# Patient Record
Sex: FEMALE | Race: WHITE | NOT HISPANIC OR LATINO | URBAN - METROPOLITAN AREA
[De-identification: names, ages, dates, MRNs, and addresses within clinical notes are randomized per-mention and may not be internally consistent; named-entity substitution may affect disease eponyms.]

---

## 2017-05-22 ENCOUNTER — INPATIENT (INPATIENT)
Facility: HOSPITAL | Age: 42
LOS: 21 days | Discharge: HOME | End: 2017-06-13
Attending: PSYCHIATRY & NEUROLOGY | Admitting: PSYCHIATRY & NEUROLOGY
Payer: COMMERCIAL

## 2017-05-22 VITALS
HEART RATE: 100 BPM | SYSTOLIC BLOOD PRESSURE: 140 MMHG | TEMPERATURE: 98 F | OXYGEN SATURATION: 98 % | DIASTOLIC BLOOD PRESSURE: 90 MMHG | RESPIRATION RATE: 18 BRPM

## 2017-05-22 DIAGNOSIS — F33.1 MAJOR DEPRESSIVE DISORDER, RECURRENT, MODERATE: ICD-10-CM

## 2017-05-22 DIAGNOSIS — F33.2 MAJOR DEPRESSIVE DISORDER, RECURRENT SEVERE WITHOUT PSYCHOTIC FEATURES: ICD-10-CM

## 2017-05-22 LAB
ALBUMIN SERPL ELPH-MCNC: 4 G/DL — SIGNIFICANT CHANGE UP (ref 3.3–5)
ALP SERPL-CCNC: 117 U/L — SIGNIFICANT CHANGE UP (ref 40–120)
ALT FLD-CCNC: 21 U/L — SIGNIFICANT CHANGE UP (ref 4–33)
AMPHET UR-MCNC: NEGATIVE — SIGNIFICANT CHANGE UP
APAP SERPL-MCNC: < 15 UG/ML — LOW (ref 15–25)
APPEARANCE UR: CLEAR — SIGNIFICANT CHANGE UP
AST SERPL-CCNC: 31 U/L — SIGNIFICANT CHANGE UP (ref 4–32)
BACTERIA # UR AUTO: SIGNIFICANT CHANGE UP
BARBITURATES MEASUREMENT: NEGATIVE — SIGNIFICANT CHANGE UP
BARBITURATES UR SCN-MCNC: NEGATIVE — SIGNIFICANT CHANGE UP
BASOPHILS # BLD AUTO: 0.03 K/UL — SIGNIFICANT CHANGE UP (ref 0–0.2)
BASOPHILS NFR BLD AUTO: 0.3 % — SIGNIFICANT CHANGE UP (ref 0–2)
BENZODIAZ SERPL-MCNC: NEGATIVE — SIGNIFICANT CHANGE UP
BENZODIAZ UR-MCNC: NEGATIVE — SIGNIFICANT CHANGE UP
BILIRUB SERPL-MCNC: < 0.2 MG/DL — LOW (ref 0.2–1.2)
BILIRUB UR-MCNC: NEGATIVE — SIGNIFICANT CHANGE UP
BLOOD UR QL VISUAL: NEGATIVE — SIGNIFICANT CHANGE UP
BUN SERPL-MCNC: 11 MG/DL — SIGNIFICANT CHANGE UP (ref 7–23)
CALCIUM SERPL-MCNC: 9.1 MG/DL — SIGNIFICANT CHANGE UP (ref 8.4–10.5)
CANNABINOIDS UR-MCNC: NEGATIVE — SIGNIFICANT CHANGE UP
CHLORIDE SERPL-SCNC: 105 MMOL/L — SIGNIFICANT CHANGE UP (ref 98–107)
CO2 SERPL-SCNC: 18 MMOL/L — LOW (ref 22–31)
COCAINE METAB.OTHER UR-MCNC: NEGATIVE — SIGNIFICANT CHANGE UP
COLOR SPEC: SIGNIFICANT CHANGE UP
CREAT SERPL-MCNC: 0.62 MG/DL — SIGNIFICANT CHANGE UP (ref 0.5–1.3)
EOSINOPHIL # BLD AUTO: 0.35 K/UL — SIGNIFICANT CHANGE UP (ref 0–0.5)
EOSINOPHIL NFR BLD AUTO: 3.5 % — SIGNIFICANT CHANGE UP (ref 0–6)
ETHANOL BLD-MCNC: < 10 MG/DL — SIGNIFICANT CHANGE UP
GLUCOSE SERPL-MCNC: 104 MG/DL — HIGH (ref 70–99)
GLUCOSE UR-MCNC: NEGATIVE — SIGNIFICANT CHANGE UP
HCG SERPL-ACNC: < 5 MIU/ML — SIGNIFICANT CHANGE UP
HCT VFR BLD CALC: 42.9 % — SIGNIFICANT CHANGE UP (ref 34.5–45)
HGB BLD-MCNC: 14.4 G/DL — SIGNIFICANT CHANGE UP (ref 11.5–15.5)
IMM GRANULOCYTES NFR BLD AUTO: 0.4 % — SIGNIFICANT CHANGE UP (ref 0–1.5)
KETONES UR-MCNC: NEGATIVE — SIGNIFICANT CHANGE UP
LEUKOCYTE ESTERASE UR-ACNC: HIGH
LYMPHOCYTES # BLD AUTO: 2.42 K/UL — SIGNIFICANT CHANGE UP (ref 1–3.3)
LYMPHOCYTES # BLD AUTO: 24.5 % — SIGNIFICANT CHANGE UP (ref 13–44)
MCHC RBC-ENTMCNC: 31.4 PG — SIGNIFICANT CHANGE UP (ref 27–34)
MCHC RBC-ENTMCNC: 33.6 % — SIGNIFICANT CHANGE UP (ref 32–36)
MCV RBC AUTO: 93.5 FL — SIGNIFICANT CHANGE UP (ref 80–100)
METHADONE UR-MCNC: NEGATIVE — SIGNIFICANT CHANGE UP
MONOCYTES # BLD AUTO: 0.81 K/UL — SIGNIFICANT CHANGE UP (ref 0–0.9)
MONOCYTES NFR BLD AUTO: 8.2 % — SIGNIFICANT CHANGE UP (ref 2–14)
MUCOUS THREADS # UR AUTO: SIGNIFICANT CHANGE UP
NEUTROPHILS # BLD AUTO: 6.21 K/UL — SIGNIFICANT CHANGE UP (ref 1.8–7.4)
NEUTROPHILS NFR BLD AUTO: 63.1 % — SIGNIFICANT CHANGE UP (ref 43–77)
NITRITE UR-MCNC: NEGATIVE — SIGNIFICANT CHANGE UP
NON-SQ EPI CELLS # UR AUTO: <1 — SIGNIFICANT CHANGE UP
OPIATES UR-MCNC: NEGATIVE — SIGNIFICANT CHANGE UP
OXYCODONE UR-MCNC: NEGATIVE — SIGNIFICANT CHANGE UP
PCP UR-MCNC: NEGATIVE — SIGNIFICANT CHANGE UP
PH UR: 6 — SIGNIFICANT CHANGE UP (ref 4.6–8)
PLATELET # BLD AUTO: 250 K/UL — SIGNIFICANT CHANGE UP (ref 150–400)
PMV BLD: 11.2 FL — SIGNIFICANT CHANGE UP (ref 7–13)
POTASSIUM SERPL-MCNC: 4.5 MMOL/L — SIGNIFICANT CHANGE UP (ref 3.5–5.3)
POTASSIUM SERPL-SCNC: 4.5 MMOL/L — SIGNIFICANT CHANGE UP (ref 3.5–5.3)
PROT SERPL-MCNC: 6.9 G/DL — SIGNIFICANT CHANGE UP (ref 6–8.3)
PROT UR-MCNC: 10 — SIGNIFICANT CHANGE UP
RBC # BLD: 4.59 M/UL — SIGNIFICANT CHANGE UP (ref 3.8–5.2)
RBC # FLD: 14.5 % — SIGNIFICANT CHANGE UP (ref 10.3–14.5)
RBC CASTS # UR COMP ASSIST: SIGNIFICANT CHANGE UP (ref 0–?)
SALICYLATES SERPL-MCNC: < 5 MG/DL — LOW (ref 15–30)
SODIUM SERPL-SCNC: 141 MMOL/L — SIGNIFICANT CHANGE UP (ref 135–145)
SP GR SPEC: 1.02 — SIGNIFICANT CHANGE UP (ref 1–1.03)
SQUAMOUS # UR AUTO: SIGNIFICANT CHANGE UP
TSH SERPL-MCNC: 1.28 UIU/ML — SIGNIFICANT CHANGE UP (ref 0.27–4.2)
UROBILINOGEN FLD QL: NORMAL E.U. — SIGNIFICANT CHANGE UP (ref 0.1–0.2)
WBC # BLD: 9.86 K/UL — SIGNIFICANT CHANGE UP (ref 3.8–10.5)
WBC # FLD AUTO: 9.86 K/UL — SIGNIFICANT CHANGE UP (ref 3.8–10.5)
WBC UR QL: SIGNIFICANT CHANGE UP (ref 0–?)

## 2017-05-22 PROCEDURE — 99285 EMERGENCY DEPT VISIT HI MDM: CPT

## 2017-05-22 RX ORDER — HALOPERIDOL DECANOATE 100 MG/ML
5 INJECTION INTRAMUSCULAR EVERY 6 HOURS
Qty: 0 | Refills: 0 | Status: DISCONTINUED | OUTPATIENT
Start: 2017-05-22 | End: 2017-06-13

## 2017-05-22 RX ORDER — DIPHENHYDRAMINE HCL 50 MG
50 CAPSULE ORAL ONCE
Qty: 0 | Refills: 0 | Status: DISCONTINUED | OUTPATIENT
Start: 2017-05-22 | End: 2017-06-13

## 2017-05-22 RX ORDER — ACETAMINOPHEN 500 MG
650 TABLET ORAL EVERY 6 HOURS
Qty: 0 | Refills: 0 | Status: DISCONTINUED | OUTPATIENT
Start: 2017-05-22 | End: 2017-06-13

## 2017-05-22 RX ORDER — HALOPERIDOL DECANOATE 100 MG/ML
5 INJECTION INTRAMUSCULAR ONCE
Qty: 0 | Refills: 0 | Status: DISCONTINUED | OUTPATIENT
Start: 2017-05-22 | End: 2017-06-13

## 2017-05-22 RX ORDER — VENLAFAXINE HCL 75 MG
150 CAPSULE, EXT RELEASE 24 HR ORAL DAILY
Qty: 0 | Refills: 0 | Status: DISCONTINUED | OUTPATIENT
Start: 2017-05-22 | End: 2017-05-24

## 2017-05-22 RX ORDER — DIPHENHYDRAMINE HCL 50 MG
50 CAPSULE ORAL EVERY 6 HOURS
Qty: 0 | Refills: 0 | Status: DISCONTINUED | OUTPATIENT
Start: 2017-05-22 | End: 2017-06-13

## 2017-05-22 RX ORDER — TOPIRAMATE 25 MG
200 TABLET ORAL AT BEDTIME
Qty: 0 | Refills: 0 | Status: DISCONTINUED | OUTPATIENT
Start: 2017-05-22 | End: 2017-06-13

## 2017-05-22 NOTE — ED PROVIDER NOTE - OBJECTIVE STATEMENT
The patient is a 41y Female hx of depression presents to ed for psych eval 2/2 worsening depression w/ Suicidal thoughts and plan to overdose.  Pt denies recent injuries, trauma or falls, no headache, back or neck pain, no abd/flank pain, no uti/urinary symptoms, nausea or vomiting, no fever or chills, no cp or sob, no palpitations or diaphoresis.  Denies etoh or illicit drugs, no HI, no AVH.  Endorsed no other symptoms.
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
## 2017-05-22 NOTE — ED BEHAVIORAL HEALTH ASSESSMENT NOTE - ACTIVATING EVENTS/STRESSORS
Current or pending isolation/Recent humiliation/shame/Pending incarceration or homelessness Current or pending isolation/Other/Recent humiliation/shame

## 2017-05-22 NOTE — ED BEHAVIORAL HEALTH ASSESSMENT NOTE - SUICIDE RISK FACTORS
Perceived burden on family and others/Agitation/severe anxiety/Highly impulsive behavior/History of abuse/trauma/Access to means (pills, firearms, etc.)/Hopelessness/Mood episode/Other/Anhedonia

## 2017-05-22 NOTE — ED BEHAVIORAL HEALTH NOTE - BEHAVIORAL HEALTH NOTE
Writer spoke with patient’s , Los Martinez, [phone], on the phone, for collateral information. He reported the following:    Patient resides with him and their 4 children, ages 19, 13 (twins) and 7. She is in treatment at [facility] after having had several inpatient episodes at [facility] over the past few years, most recently at [facility] 6 weeks ago. She has been expressing suicidal ideation for the past couple of days, so the informant brought her to the ED.     The patient has been increasingly depressed, and not tending to her hygiene or the household, though she has been forcing herself to provide for the needs of the children. She has been experiencing broken sleep and nightmares, and anergia, lying around the house all day. She talks in her sleep, revealing strange dreams. In recent days, she has stated she does not want to live. She has been mentioning instructions on what she wants done by other people after she dies. She told the informant not to worry, because if she “does something to herself, it won’t be in the house.” Though she has a history of impulsive outbursts, this has not happened lately. She has been asking the informant what he thinks of her going off her medications for the past 4 days. The informant believes she has been intermittently compliant with her medications. At [facility] the patient was put on Effexor. She has been taken off Cymbalta, and is suffering with migraines. Three weeks ago she stopped taking a sleep medication due to weight gain. She has been eating well. She has not been aggressive or violent toward anyone or property, and has not verbalized thoughts of such behavior. She does not have access to a gun. She has no history of substance abuse.     Writer informed Mr. Martinez that the patient would be admitted. Writer spoke with patient’s , Los Martinez, [phone], on the phone, for collateral information. He reported the following:    Patient resides with him and their 4 children, ages 19, 13 (twins) and 7. She is in treatment at [facility] after having had several inpatient episodes at [facility] over the past few years, most recently at [facility] 6 weeks ago. She has been expressing suicidal ideation for the past couple of days, so the informant brought her to the ED.     The patient has been increasingly depressed, and not tending to her hygiene or the household, though she has been forcing herself to provide for the needs of the children. She has been experiencing broken sleep and nightmares, and anergia, lying around the house all day. She talks in her sleep, revealing strange dreams. In recent days, she has stated she does not want to live. She has been mentioning instructions on what she wants done by other people after she dies. She told the informant not to worry, because if she “does something to herself, it won’t be in the house.” Though she has a history of impulsive outbursts, this has not happened lately. She has been asking the informant what he thinks of her going off her medications for the past 4 days. The informant believes she has been intermittently compliant with her medications. At [facility] the patient was put on Effexor. She has been taken off Cymbalta, and is suffering with migraines. Three weeks ago she stopped taking a sleep medication due to weight gain. She has been eating well. She has not been aggressive or violent toward anyone or property, and has not verbalized thoughts of such behavior. She does not have access to a gun. She has no history of substance abuse.     Writer informed Mr. Martinez that the patient would be admitted to [facility], providing the phone number to the unit.
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
## 2017-05-22 NOTE — ED BEHAVIORAL HEALTH ASSESSMENT NOTE - FAMILY DETAILS
Lives with , 12 y/o twins, 5 y/o child, 20 y/o son is at college. Lives with , 12 y/o twins, 5 y/o child, 20 y/o daughter is at college but currently home

## 2017-05-22 NOTE — ED BEHAVIORAL HEALTH ASSESSMENT NOTE - PRIMARY DX
Moderate episode of recurrent major depressive disorder Borderline personality disorder in adult Severe episode of recurrent major depressive disorder, without psychotic features

## 2017-05-22 NOTE — ED ADULT TRIAGE NOTE - CHIEF COMPLAINT QUOTE
p/t c/o of increased depression and si for past few days with plan p/t appears calm and cooperative @ present

## 2017-05-22 NOTE — ED BEHAVIORAL HEALTH ASSESSMENT NOTE - PSYCHIATRIC ISSUES AND PLAN (INCLUDE STANDING AND PRN MEDICATION)
continue Topamax 200 mg po qday, Cymbalta 40 mg po daily, Zoloft 150mg daily, Trazodone 25mg qhs, Naltrexone 50 mg po BID, Anxiety: Atarax 50mg q6h PRN Continue Effexor 150mg AM (Pt requesting to come off this medication) and Topamax 200mg HS. Consider ECT as patient has been tried on multiple medication trials without success. May utilize PRNS: haldol 5mg, ativan 2mg, diphenhydramine 50mg, PO/IM, Q6H for Agitation
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
## 2017-05-22 NOTE — ED PROVIDER NOTE - MEDICAL DECISION MAKING DETAILS
41y Female hx of depression presents to ed for psych eval 2/2 worsening depression w/ Suicidal thoughts and plan to overdose.  Pt is well appearing w/o visible signs of physical injuries on exam, head NCAT, no C-spine tend, CN II- XII intact, ambulating w/ steady gait.  Labs unrevealing, medically cleared for psych disposition.

## 2017-05-22 NOTE — ED BEHAVIORAL HEALTH ASSESSMENT NOTE - CURRENT MEDICATION
Topamax 200 mg po qday, Cymbalta 40 mg po daily, Zoloft 150mg daily, Trazodone 25mg qhs, Naltrexone 50 mg po BID Topamax 200 mg po HS, Topamax 200 mg po HS, Effexor 300mg AM (has only been taking 150mg daily for past 3 days).

## 2017-05-22 NOTE — ED BEHAVIORAL HEALTH ASSESSMENT NOTE - RISK ASSESSMENT
Pt endorses SI w/ plans to OD on trazodone. Chronic and unmodifiable risk factors include hx of MDD, trauma, hopelessness, social isolation and losses due to her ongoing legal issues, impulsive-aggressive behavior, hx of self-injurious behavior. The patient's protective risk factors include no access to guns, dependents, positive therapeutic relationships and supportive family. Based on the patient’s worsening depression and suicidality, she requires inpatient hospitalization for safety and stabilization.

## 2017-05-22 NOTE — ED BEHAVIORAL HEALTH ASSESSMENT NOTE - PAST PSYCHOTROPIC MEDICATION
Wellbutrin, Atarax, Abilify. Wellbutrin, Atarax, Abilify.Cymbalta 40 mg po daily, Zoloft 150mg daily, Trazodone 25mg qhs, Naltrexone 50 mg po BID Wellbutrin, Atarax, Abilify. Cymbalta, Zoloft, Trazodone, Naltrexone, Buspar, Latuda, Seroquel, Neurontin.
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
## 2017-05-23 PROCEDURE — 99223 1ST HOSP IP/OBS HIGH 75: CPT

## 2017-05-23 RX ORDER — HYDROXYZINE HCL 10 MG
50 TABLET ORAL EVERY 6 HOURS
Qty: 0 | Refills: 0 | Status: DISCONTINUED | OUTPATIENT
Start: 2017-05-23 | End: 2017-06-13

## 2017-05-23 RX ADMIN — Medication 50 MILLIGRAM(S): at 16:11

## 2017-05-23 RX ADMIN — Medication 2 MILLIGRAM(S): at 01:38

## 2017-05-23 RX ADMIN — Medication 150 MILLIGRAM(S): at 08:30

## 2017-05-23 RX ADMIN — Medication 200 MILLIGRAM(S): at 20:55

## 2017-05-23 RX ADMIN — Medication 200 MILLIGRAM(S): at 01:37

## 2017-05-24 PROCEDURE — 99232 SBSQ HOSP IP/OBS MODERATE 35: CPT | Mod: 25

## 2017-05-24 RX ORDER — VENLAFAXINE HCL 75 MG
112.5 CAPSULE, EXT RELEASE 24 HR ORAL DAILY
Qty: 0 | Refills: 0 | Status: DISCONTINUED | OUTPATIENT
Start: 2017-05-24 | End: 2017-05-25

## 2017-05-24 RX ORDER — VORTIOXETINE 5 MG/1
1 TABLET, FILM COATED ORAL
Qty: 7 | Refills: 0 | OUTPATIENT
Start: 2017-05-24 | End: 2017-05-31

## 2017-05-24 RX ORDER — CLONAZEPAM 1 MG
0.5 TABLET ORAL DAILY
Qty: 0 | Refills: 0 | Status: DISCONTINUED | OUTPATIENT
Start: 2017-05-24 | End: 2017-05-25

## 2017-05-24 RX ORDER — CLONAZEPAM 1 MG
1 TABLET ORAL AT BEDTIME
Qty: 0 | Refills: 0 | Status: DISCONTINUED | OUTPATIENT
Start: 2017-05-24 | End: 2017-05-25

## 2017-05-24 RX ADMIN — Medication 200 MILLIGRAM(S): at 21:20

## 2017-05-24 RX ADMIN — Medication 1 MILLIGRAM(S): at 21:20

## 2017-05-24 RX ADMIN — Medication 150 MILLIGRAM(S): at 09:16

## 2017-05-25 PROCEDURE — 90834 PSYTX W PT 45 MINUTES: CPT | Mod: 59

## 2017-05-25 PROCEDURE — 99232 SBSQ HOSP IP/OBS MODERATE 35: CPT

## 2017-05-25 PROCEDURE — 90853 GROUP PSYCHOTHERAPY: CPT

## 2017-05-25 RX ORDER — CLONAZEPAM 1 MG
0.5 TABLET ORAL DAILY
Qty: 0 | Refills: 0 | Status: DISCONTINUED | OUTPATIENT
Start: 2017-05-25 | End: 2017-06-01

## 2017-05-25 RX ORDER — VENLAFAXINE HCL 75 MG
75 CAPSULE, EXT RELEASE 24 HR ORAL DAILY
Qty: 0 | Refills: 0 | Status: DISCONTINUED | OUTPATIENT
Start: 2017-05-25 | End: 2017-05-30

## 2017-05-25 RX ORDER — CLONAZEPAM 1 MG
1 TABLET ORAL AT BEDTIME
Qty: 0 | Refills: 0 | Status: DISCONTINUED | OUTPATIENT
Start: 2017-05-25 | End: 2017-06-01

## 2017-05-25 RX ORDER — VORTIOXETINE 5 MG/1
5 TABLET, FILM COATED ORAL DAILY
Qty: 0 | Refills: 0 | Status: DISCONTINUED | OUTPATIENT
Start: 2017-05-25 | End: 2017-05-31

## 2017-05-25 RX ADMIN — Medication 200 MILLIGRAM(S): at 21:43

## 2017-05-25 RX ADMIN — Medication 1 MILLIGRAM(S): at 21:43

## 2017-05-25 RX ADMIN — Medication 650 MILLIGRAM(S): at 13:57

## 2017-05-25 RX ADMIN — Medication 0.5 MILLIGRAM(S): at 08:43

## 2017-05-25 RX ADMIN — Medication 112.5 MILLIGRAM(S): at 08:43

## 2017-05-25 RX ADMIN — Medication 650 MILLIGRAM(S): at 13:12

## 2017-05-26 PROCEDURE — 99232 SBSQ HOSP IP/OBS MODERATE 35: CPT

## 2017-05-26 RX ADMIN — Medication 0.5 MILLIGRAM(S): at 09:24

## 2017-05-26 RX ADMIN — Medication 1 MILLIGRAM(S): at 21:10

## 2017-05-26 RX ADMIN — Medication 75 MILLIGRAM(S): at 09:24

## 2017-05-26 RX ADMIN — VORTIOXETINE 5 MILLIGRAM(S): 5 TABLET, FILM COATED ORAL at 09:24

## 2017-05-26 RX ADMIN — Medication 200 MILLIGRAM(S): at 21:10

## 2017-05-27 PROCEDURE — 99232 SBSQ HOSP IP/OBS MODERATE 35: CPT

## 2017-05-27 RX ADMIN — Medication 200 MILLIGRAM(S): at 22:17

## 2017-05-27 RX ADMIN — Medication 0.5 MILLIGRAM(S): at 09:00

## 2017-05-27 RX ADMIN — Medication 75 MILLIGRAM(S): at 09:00

## 2017-05-27 RX ADMIN — VORTIOXETINE 5 MILLIGRAM(S): 5 TABLET, FILM COATED ORAL at 09:00

## 2017-05-27 RX ADMIN — Medication 1 MILLIGRAM(S): at 22:17

## 2017-05-28 LAB — HBA1C BLD-MCNC: 5.6 % — SIGNIFICANT CHANGE UP (ref 4–5.6)

## 2017-05-28 RX ADMIN — Medication 75 MILLIGRAM(S): at 08:55

## 2017-05-28 RX ADMIN — Medication 650 MILLIGRAM(S): at 13:18

## 2017-05-28 RX ADMIN — Medication 650 MILLIGRAM(S): at 14:07

## 2017-05-28 RX ADMIN — Medication 200 MILLIGRAM(S): at 21:17

## 2017-05-28 RX ADMIN — Medication 50 MILLIGRAM(S): at 14:54

## 2017-05-28 RX ADMIN — VORTIOXETINE 5 MILLIGRAM(S): 5 TABLET, FILM COATED ORAL at 08:55

## 2017-05-28 RX ADMIN — Medication 0.5 MILLIGRAM(S): at 08:55

## 2017-05-28 RX ADMIN — Medication 1 MILLIGRAM(S): at 21:17

## 2017-05-29 RX ADMIN — Medication 75 MILLIGRAM(S): at 08:41

## 2017-05-29 RX ADMIN — Medication 0.5 MILLIGRAM(S): at 08:41

## 2017-05-29 RX ADMIN — Medication 200 MILLIGRAM(S): at 21:03

## 2017-05-29 RX ADMIN — Medication 1 MILLIGRAM(S): at 21:03

## 2017-05-29 RX ADMIN — VORTIOXETINE 5 MILLIGRAM(S): 5 TABLET, FILM COATED ORAL at 08:41

## 2017-05-30 PROCEDURE — 99232 SBSQ HOSP IP/OBS MODERATE 35: CPT | Mod: 25

## 2017-05-30 RX ORDER — VORTIOXETINE 5 MG/1
1 TABLET, FILM COATED ORAL
Qty: 15 | Refills: 0 | OUTPATIENT
Start: 2017-05-30 | End: 2017-06-14

## 2017-05-30 RX ORDER — VENLAFAXINE HCL 75 MG
37.5 CAPSULE, EXT RELEASE 24 HR ORAL DAILY
Qty: 0 | Refills: 0 | Status: DISCONTINUED | OUTPATIENT
Start: 2017-05-30 | End: 2017-06-01

## 2017-05-30 RX ADMIN — VORTIOXETINE 5 MILLIGRAM(S): 5 TABLET, FILM COATED ORAL at 08:57

## 2017-05-30 RX ADMIN — Medication 1 MILLIGRAM(S): at 22:55

## 2017-05-30 RX ADMIN — Medication 75 MILLIGRAM(S): at 08:57

## 2017-05-30 RX ADMIN — Medication 0.5 MILLIGRAM(S): at 08:57

## 2017-05-30 RX ADMIN — Medication 200 MILLIGRAM(S): at 22:55

## 2017-05-31 PROCEDURE — 90834 PSYTX W PT 45 MINUTES: CPT

## 2017-05-31 PROCEDURE — 99232 SBSQ HOSP IP/OBS MODERATE 35: CPT

## 2017-05-31 RX ORDER — VORTIOXETINE 5 MG/1
10 TABLET, FILM COATED ORAL DAILY
Qty: 0 | Refills: 0 | Status: DISCONTINUED | OUTPATIENT
Start: 2017-06-01 | End: 2017-06-09

## 2017-05-31 RX ORDER — VORTIOXETINE 5 MG/1
10 TABLET, FILM COATED ORAL ONCE
Qty: 0 | Refills: 0 | Status: COMPLETED | OUTPATIENT
Start: 2017-05-31 | End: 2017-05-31

## 2017-05-31 RX ADMIN — Medication 37.5 MILLIGRAM(S): at 09:16

## 2017-05-31 RX ADMIN — Medication 1 MILLIGRAM(S): at 22:59

## 2017-05-31 RX ADMIN — Medication 650 MILLIGRAM(S): at 11:33

## 2017-05-31 RX ADMIN — VORTIOXETINE 10 MILLIGRAM(S): 5 TABLET, FILM COATED ORAL at 15:31

## 2017-05-31 RX ADMIN — Medication 650 MILLIGRAM(S): at 13:43

## 2017-05-31 RX ADMIN — Medication 200 MILLIGRAM(S): at 22:59

## 2017-05-31 RX ADMIN — Medication 0.5 MILLIGRAM(S): at 09:16

## 2017-06-01 PROCEDURE — 99232 SBSQ HOSP IP/OBS MODERATE 35: CPT

## 2017-06-01 RX ORDER — CLONAZEPAM 1 MG
1 TABLET ORAL AT BEDTIME
Qty: 0 | Refills: 0 | Status: DISCONTINUED | OUTPATIENT
Start: 2017-06-01 | End: 2017-06-08

## 2017-06-01 RX ORDER — CLONAZEPAM 1 MG
0.5 TABLET ORAL DAILY
Qty: 0 | Refills: 0 | Status: DISCONTINUED | OUTPATIENT
Start: 2017-06-01 | End: 2017-06-08

## 2017-06-01 RX ADMIN — Medication 1 MILLIGRAM(S): at 23:43

## 2017-06-01 RX ADMIN — Medication 200 MILLIGRAM(S): at 23:43

## 2017-06-01 RX ADMIN — VORTIOXETINE 10 MILLIGRAM(S): 5 TABLET, FILM COATED ORAL at 09:30

## 2017-06-01 RX ADMIN — Medication 37.5 MILLIGRAM(S): at 09:30

## 2017-06-01 RX ADMIN — Medication 0.5 MILLIGRAM(S): at 09:30

## 2017-06-02 PROCEDURE — 90832 PSYTX W PT 30 MINUTES: CPT

## 2017-06-02 PROCEDURE — 99232 SBSQ HOSP IP/OBS MODERATE 35: CPT

## 2017-06-02 RX ADMIN — Medication 1 MILLIGRAM(S): at 23:04

## 2017-06-02 RX ADMIN — Medication 200 MILLIGRAM(S): at 23:04

## 2017-06-02 RX ADMIN — Medication 0.5 MILLIGRAM(S): at 08:40

## 2017-06-02 RX ADMIN — VORTIOXETINE 10 MILLIGRAM(S): 5 TABLET, FILM COATED ORAL at 08:40

## 2017-06-03 PROCEDURE — 99232 SBSQ HOSP IP/OBS MODERATE 35: CPT

## 2017-06-03 RX ADMIN — Medication 2 MILLIGRAM(S): at 19:51

## 2017-06-03 RX ADMIN — VORTIOXETINE 10 MILLIGRAM(S): 5 TABLET, FILM COATED ORAL at 08:34

## 2017-06-03 RX ADMIN — Medication 1 MILLIGRAM(S): at 20:26

## 2017-06-03 RX ADMIN — Medication 0.5 MILLIGRAM(S): at 08:34

## 2017-06-03 RX ADMIN — Medication 2 MILLIGRAM(S): at 11:28

## 2017-06-03 RX ADMIN — Medication 200 MILLIGRAM(S): at 20:26

## 2017-06-04 PROCEDURE — 99232 SBSQ HOSP IP/OBS MODERATE 35: CPT

## 2017-06-04 RX ADMIN — Medication 0.5 MILLIGRAM(S): at 10:10

## 2017-06-04 RX ADMIN — Medication 1 MILLIGRAM(S): at 22:37

## 2017-06-04 RX ADMIN — VORTIOXETINE 10 MILLIGRAM(S): 5 TABLET, FILM COATED ORAL at 10:10

## 2017-06-04 RX ADMIN — Medication 200 MILLIGRAM(S): at 22:37

## 2017-06-05 PROCEDURE — 99232 SBSQ HOSP IP/OBS MODERATE 35: CPT

## 2017-06-05 PROCEDURE — 90832 PSYTX W PT 30 MINUTES: CPT

## 2017-06-05 RX ADMIN — Medication 1 MILLIGRAM(S): at 22:19

## 2017-06-05 RX ADMIN — Medication 200 MILLIGRAM(S): at 22:19

## 2017-06-05 RX ADMIN — VORTIOXETINE 10 MILLIGRAM(S): 5 TABLET, FILM COATED ORAL at 10:43

## 2017-06-05 RX ADMIN — Medication 0.5 MILLIGRAM(S): at 10:43

## 2017-06-06 PROCEDURE — 99232 SBSQ HOSP IP/OBS MODERATE 35: CPT

## 2017-06-06 RX ADMIN — Medication 1 MILLIGRAM(S): at 23:42

## 2017-06-06 RX ADMIN — Medication 200 MILLIGRAM(S): at 23:42

## 2017-06-06 RX ADMIN — Medication 0.5 MILLIGRAM(S): at 09:03

## 2017-06-06 RX ADMIN — VORTIOXETINE 10 MILLIGRAM(S): 5 TABLET, FILM COATED ORAL at 09:03

## 2017-06-07 PROCEDURE — 99232 SBSQ HOSP IP/OBS MODERATE 35: CPT

## 2017-06-07 RX ADMIN — Medication 200 MILLIGRAM(S): at 22:29

## 2017-06-07 RX ADMIN — Medication 0.5 MILLIGRAM(S): at 09:16

## 2017-06-07 RX ADMIN — Medication 1 MILLIGRAM(S): at 22:29

## 2017-06-07 RX ADMIN — VORTIOXETINE 10 MILLIGRAM(S): 5 TABLET, FILM COATED ORAL at 09:16

## 2017-06-07 RX ADMIN — Medication 50 MILLIGRAM(S): at 20:00

## 2017-06-08 PROCEDURE — 99232 SBSQ HOSP IP/OBS MODERATE 35: CPT

## 2017-06-08 RX ORDER — CLONAZEPAM 1 MG
2 TABLET ORAL AT BEDTIME
Qty: 0 | Refills: 0 | Status: DISCONTINUED | OUTPATIENT
Start: 2017-06-08 | End: 2017-06-13

## 2017-06-08 RX ADMIN — VORTIOXETINE 10 MILLIGRAM(S): 5 TABLET, FILM COATED ORAL at 08:38

## 2017-06-08 RX ADMIN — Medication 0.5 MILLIGRAM(S): at 08:38

## 2017-06-09 PROCEDURE — 99232 SBSQ HOSP IP/OBS MODERATE 35: CPT

## 2017-06-09 PROCEDURE — 90834 PSYTX W PT 45 MINUTES: CPT

## 2017-06-09 RX ORDER — VORTIOXETINE 5 MG/1
1 TABLET, FILM COATED ORAL
Qty: 30 | Refills: 0 | OUTPATIENT
Start: 2017-06-09 | End: 2017-07-09

## 2017-06-09 RX ORDER — CLONAZEPAM 1 MG
0.5 TABLET ORAL DAILY
Qty: 0 | Refills: 0 | Status: DISCONTINUED | OUTPATIENT
Start: 2017-06-10 | End: 2017-06-13

## 2017-06-09 RX ORDER — VORTIOXETINE 5 MG/1
20 TABLET, FILM COATED ORAL DAILY
Qty: 0 | Refills: 0 | Status: DISCONTINUED | OUTPATIENT
Start: 2017-06-09 | End: 2017-06-13

## 2017-06-09 RX ORDER — CLONAZEPAM 1 MG
0.5 TABLET ORAL ONCE
Qty: 0 | Refills: 0 | Status: DISCONTINUED | OUTPATIENT
Start: 2017-06-09 | End: 2017-06-09

## 2017-06-09 RX ADMIN — Medication 0.5 MILLIGRAM(S): at 11:41

## 2017-06-09 RX ADMIN — Medication 2 MILLIGRAM(S): at 22:36

## 2017-06-09 RX ADMIN — VORTIOXETINE 10 MILLIGRAM(S): 5 TABLET, FILM COATED ORAL at 09:25

## 2017-06-09 RX ADMIN — Medication 200 MILLIGRAM(S): at 22:36

## 2017-06-10 RX ADMIN — Medication 2 MILLIGRAM(S): at 23:40

## 2017-06-10 RX ADMIN — Medication 200 MILLIGRAM(S): at 23:40

## 2017-06-10 RX ADMIN — VORTIOXETINE 20 MILLIGRAM(S): 5 TABLET, FILM COATED ORAL at 09:14

## 2017-06-10 RX ADMIN — Medication 0.5 MILLIGRAM(S): at 09:14

## 2017-06-11 RX ADMIN — Medication 0.5 MILLIGRAM(S): at 09:42

## 2017-06-11 RX ADMIN — Medication 50 MILLIGRAM(S): at 14:59

## 2017-06-11 RX ADMIN — VORTIOXETINE 20 MILLIGRAM(S): 5 TABLET, FILM COATED ORAL at 09:42

## 2017-06-12 PROCEDURE — 90834 PSYTX W PT 45 MINUTES: CPT

## 2017-06-12 PROCEDURE — 99232 SBSQ HOSP IP/OBS MODERATE 35: CPT

## 2017-06-12 RX ORDER — TOPIRAMATE 25 MG
1 TABLET ORAL
Qty: 30 | Refills: 0 | OUTPATIENT
Start: 2017-06-12 | End: 2017-07-12

## 2017-06-12 RX ORDER — CLONAZEPAM 1 MG
1 TABLET ORAL
Qty: 30 | Refills: 0 | OUTPATIENT
Start: 2017-06-12 | End: 2017-07-12

## 2017-06-12 RX ADMIN — Medication 2 MILLIGRAM(S): at 01:16

## 2017-06-12 RX ADMIN — Medication 0.5 MILLIGRAM(S): at 08:05

## 2017-06-12 RX ADMIN — VORTIOXETINE 20 MILLIGRAM(S): 5 TABLET, FILM COATED ORAL at 08:05

## 2017-06-12 RX ADMIN — Medication 200 MILLIGRAM(S): at 01:16

## 2017-06-13 VITALS — TEMPERATURE: 98 F

## 2017-06-13 RX ADMIN — Medication 200 MILLIGRAM(S): at 01:39

## 2017-06-13 RX ADMIN — Medication 2 MILLIGRAM(S): at 01:39
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
## 2022-03-28 ENCOUNTER — OUTPATIENT (OUTPATIENT)
Dept: OUTPATIENT SERVICES | Facility: HOSPITAL | Age: 47
LOS: 1 days | Discharge: HOME | End: 2022-03-28
Payer: COMMERCIAL

## 2022-03-29 PROCEDURE — 99205 OFFICE O/P NEW HI 60 MIN: CPT

## 2022-03-29 NOTE — ECT CONSULT NOTE - OTHER PAST PSYCHIATRIC HISTORY (INCLUDE DETAILS REGARDING ONSET, COURSE OF ILLNESS, INPATIENT/OUTPATIENT TREATMENT)
45 yo Female, , domiciled with her , mother-in-law, youngest daughter and pets (4 dogs, 3 cats, 1 tortoise), mother of 4 kids, with a degree of Masters in education, was a teacher in high school, on probation for having inappropriate relation with minor, registered sex-offender, h/o around 12 psychiatric hospitalizations (between 7576-9610), no h/o suicide attempt, no h/o alcohol use/nicotine use/recreational drug use, raised as Bahai but does not believe in a particular Methodist, no access to Fire-arms, referred for a pre-ECT evaluation for severe treatment resistant depression.  45 yo Female, , domiciled with her , mother-in-law, youngest daughter and pets (4 dogs, 3 cats, 1 tortoise), mother of 4 kids, with a degree of Masters in education, was a teacher in high school, on probation for having inappropriate relation with minor, registered sex-offender, h/o around 12 psychiatric hospitalizations (between 0022-8675), no h/o suicide attempt, no h/o alcohol use/nicotine use/recreational drug use, raised as Anglican but does not believe in a particular Nondenominational, no access to Fire-arms, referred for a pre-ECT evaluation for severe treatment resistant depression.         Past Psychiatric History: Ms. Martinez reports "a dozen" psychiatric hospitalizations between 1851-9624. Most of them were at [facility], but was also admitted at [facility] and at [facility]. No h/o Suicide attempt    Social History: Has 3 kids (23 yo daughter, 2 17 yo twin sons) from the first marriage. Has a 11 years old daughter from the current marriage. Patient has a masters degree in education. Was working as a teacher in highschool. She had an inappropriate relationship with a student and was arrested in 2014, registered as a sex-offender and is currently on probation. Denies EtOH use/tobacco use/recreational drug use     47 yo Female, , domiciled with her , mother-in-law, youngest daughter and pets (4 dogs, 3 cats, 1 tortoise), mother of 4 kids, with a degree of Masters in education, was a teacher in high school, on probation for having inappropriate relation with minor, registered sex-offender, h/o around 12 psychiatric hospitalizations (between 1264-4409), no h/o suicide attempt, no h/o alcohol use/nicotine use/recreational drug use, raised as Muslim but does not believe in a particular Synagogue, no access to Fire-arms, referred for a pre-ECT evaluation for severe treatment resistant depression.         Past Psychiatric History: Ms. Martinez reports "a dozen" psychiatric hospitalizations between 4252-1365. Most of them were at [facility], but was also admitted at [facility] and at [facility]. No h/o Suicide attempt    Social History: Has 3 kids (23 yo daughter, 2 19 yo twin sons) from the first marriage. Has a 11 years old daughter from the current marriage. Patient has a masters degree in education. Was working as a teacher in highschool. She had an inappropriate relationship with a student and was arrested in 2014, registered as a sex-offender and is currently on probation. Denies EtOH use/tobacco use/recreational drug use. Denies access to fire-arms. Her relationship with her  as well as her oldest daughter is strained.      45 yo Female, , domiciled with her , mother-in-law, youngest daughter and pets (4 dogs, 3 cats, 1 tortoise), mother of 4 kids, with a degree of Masters in education, was a teacher in high school, on probation for having inappropriate relation with minor, registered sex-offender, h/o around 12 psychiatric hospitalizations (between 6943-3464), no h/o suicide attempt, no h/o alcohol use/nicotine use/recreational drug use, raised as Baptist but does not believe in a particular Advent, no access to Fire-arms, referred for a pre-ECT evaluation for severe treatment resistant depression.     Patient reports that she has a long history of depression, that started when she was 21 years old. It started with depression and anxiety in college and she was put on Zoloft and Buspar during those times. She reports that in past, her depression was episodic and feels it was “opposite of postpartum depression”, where she would feel well and not depressed during pregnancy as well as few years after pregnancy, while taking care of the kids.      Her depression was significantly worse between 2014 and 2017 when she had multiple psychiatric hospitalizations. She continued to have depression after that, but feels that her depression was significantly better between 2020 and 2021 when during the COVID lockdown, her entire family was home, including her sons and she was able to spend time with the family and did not feel isolated. At that time, she continued to have mild depression, rates it as mild, “3/10” with 10 being the worst depression.     Her most recent exacerbation happened over last 1 year as her isolation worsened. She rates her depression as 9/10 with 10 being the worst depression. It is not associated with any diurnal variation. She describes her energy as low and “terrible”. Her options to be at places are severely restricted due to her probation as well as being registered as a sex-offender. However, during the lock-down when she was accompanied by family, she was able to enjoy going to Gym, Bike riding, taking dogs for walk with kids, crafting, watching movies. Currently, she is not able to enjoy the above. She has terminal insomnia. Her appetite is increased. She reports crying spells everyday. Her self-esteem is low and she often feels worthless.  She has lot of guilt feelings about her past actions that led to her arrest. She ruminates about the things related to her arrest and how that has “destroyed her family life”. She accepts responsibilities for her action. She lost all her friends after the event. Her relationship with her family, especially her  and oldest daughter are strained. As a condition of her probation, she cannot got to [employer], dog park, beaches. She can’t attend any events in her kids’ school or watch her son perform in sports events, or attend birthday parties. She finds herself getting rejected because of her legal record and finds it hard to deal with: “Forget about job, I can’t even volunteer. Rejection hits hard”.  She hasn’t looked for volunteer opportunities recently and finds that between her elderly mother-in-law (87 yo) and her pets, she does not have many hours available for volunteering and doesn't have energy/motivation to do so.  She reports passive death wishes and suicidal ideation. “I wish I wasn’t around”. “I don’t want to kill myself”. Reports she has chronic ideation of killing herself with poisoning but denies any intent to hurt herself. She cites her children as the main protective factor.     She denies h/o manic symptoms. She had questionable hypomanic symptoms in the week that she got arrested. She was juggling multiple activities, from one task to another, had lot of energy, was talkative.     She had difficulty concentrating and reports her memory to be “very bad”. She has h/o social phobia that has been exacerbated after her arrest. She denies any delusions or hallucinations or OCD symptoms.     Past Psychiatric History: Ms. Martinez reports "a dozen" psychiatric hospitalizations between 7923-7394. Most of them were at [facility], but was also admitted at [facility] and at [facility] [place]. No h/o Suicide attempt    Social History: Ms. Martinez has 4 chidlren--3 kids (23 yo daughter, 2 19 yo twin sons) from the first marriage and a 11 years old daughter from the current marriage. Patient has a masters degree in education. Was working as a teacher in highschool. She had an inappropriate relationship with a student and was arrested in 2014, registered as a sex-offender and is currently on probation. Denies EtOH use/tobacco use/recreational drug use. Denies access to fire-arms. Her relationship with her  as well as her oldest daughter is strained. 45 yo Female, , domiciled with her , mother-in-law, youngest daughter and pets (4 dogs, 3 cats, 1 tortoise), mother of 4 kids, with a degree of Masters in education, was a teacher in high school, h/o legal problem, h/o around 12 psychiatric hospitalizations (between 8639-8308), no h/o suicide attempt, no h/o alcohol use/nicotine use/recreational drug use, raised as Jain but does not believe in a particular Cheondoism, no access to Fire-arms, referred for a pre-ECT evaluation for severe treatment resistant depression.     Patient reports that she has a long history of depression, that started when she was 21 years old. It started with depression and anxiety in college and she was put on Zoloft and Buspar during those times. She reports that in past, her depression was episodic and feels it was “opposite of postpartum depression”, where she would feel well and not depressed during pregnancy as well as few years after pregnancy, while taking care of the kids.      Her depression was significantly worse between 2014 and 2017 when she had multiple psychiatric hospitalizations. She continued to have depression after that, but feels that her depression was significantly better between 2020 and 2021 when during the COVID lockdown, her entire family was home, including her sons and she was able to spend time with the family and did not feel isolated. At that time, she continued to have mild depression, rates it as mild, “3/10” with 10 being the worst depression.     Her most recent exacerbation happened over last 1 year as her isolation worsened. She rates her depression as 9/10 with 10 being the worst depression. It is not associated with any diurnal variation. She describes her energy as low and “terrible”. Her options to be at places are severely restricted due to her probation as well as being registered as a sex-offender. However, during the lock-down when she was accompanied by family, she was able to enjoy going to Gym, Bike riding, taking dogs for walk with kids, crafting, watching movies. Currently, she is not able to enjoy the above. She has terminal insomnia. Her appetite is increased. She reports crying spells everyday. Her self-esteem is low and she often feels worthless.  She has lot of guilt feelings about her past actions that led to her arrest. She ruminates about the things related to her arrest and how that has “destroyed her family life”. She accepts responsibilities for her action. She lost all her friends after the event. Her relationship with her family, especially her  and oldest daughter are strained. As a condition of her probation, she cannot got to [employer], dog park, beaches. She can’t attend any events in her kids’ school or watch her son perform in sports events, or attend birthday parties. She finds herself getting rejected because of her legal record and finds it hard to deal with: “Forget about job, I can’t even volunteer. Rejection hits hard”.  She hasn’t looked for volunteer opportunities recently and finds that between her elderly mother-in-law (87 yo) and her pets, she does not have many hours available for volunteering and doesn't have energy/motivation to do so.  She reports passive death wishes and suicidal ideation. “I wish I wasn’t around”. “I don’t want to kill myself”. Reports she has chronic ideation of killing herself with poisoning but denies any intent to hurt herself. She cites her children as the main protective factor.     She denies h/o manic symptoms. She had questionable hypomanic symptoms in the week that she got arrested. She was juggling multiple activities, from one task to another, had lot of energy, was talkative.     She had difficulty concentrating and reports her memory to be “very bad”. She has h/o social phobia that has been exacerbated after her arrest. She denies any delusions or hallucinations or OCD symptoms.     Past Psychiatric History: Ms. Martinez reports "a dozen" psychiatric hospitalizations between 4638-2108. Most of them were at [facility], but was also admitted at [facility] and at [facility]. No h/o Suicide attempt    Social History: Ms. Martinez has 4 chidlren--3 kids (23 yo daughter, 2 19 yo twin sons) from the first marriage and a 11 years old daughter from the current marriage. Patient has a masters degree in education. Was working as a teacher in high school. She had an inappropriate relationship with a student and was arrested in 2014, registered as a sex-offender and is currently on probation with several restrictions. Denies EtOH use/tobacco use/recreational drug use. Denies access to fire-arms. Her relationship with her  as well as her oldest daughter is strained.

## 2022-03-29 NOTE — ECT CONSULT NOTE - NSECTPASTMEDTRIALOTHER_PSY_ALL_CORE
"   08/02/20 1400   Quick Adds   Type of Visit Initial Occupational Therapy Evaluation   Living Environment   Lives With alone   Living Arrangements independent living facility   Home Accessibility no concerns   Transportation Anticipated car, drives self   Living Environment Comment Pt lives alone in 2 bedroom apartment. Pt uses shower chair when bathing, will further assess tub vs shower.   Self-Care   Usual Activity Tolerance moderate   Current Activity Tolerance fair   Regular Exercise No   Equipment Currently Used at Home cane, quad;walker, rolling;shower chair;grab bar, toilet;grab bar, tub/shower   Activity/Exercise/Self-Care Comment Pt reports IND/Mod I for ADLs/IADLs. Pt has been ordering groceries for delivery due to Covid concerns.   Functional Level   Ambulation 1-->assistive equipment   Transferring 1-->assistive equipment   Toileting 0-->independent   Bathing 1-->assistive equipment   Dressing 0-->independent   Eating 0-->independent   Communication 0-->understands/communicates without difficulty   Swallowing 0-->swallows foods/liquids without difficulty   Cognition 0 - no cognition issues reported   Fall history within last six months yes   Number of times patient has fallen within last six months 1   Which of the above functional risks had a recent onset or change? ambulation;toileting;bathing;transferring   Prior Functional Level Comment Pt uses 4WW at baseline, is IND/Mod I for ADLs. Pt has one daughter who can provide limited support, another daughter lives in [place], [place].   General Information   Referring Physician 8/1/2020   Patient/Family Goals Statement To get stronger   Additional Occupational Profile Info/Pertinent History of Current Problem Per H&P: \"73 year old female admitted on 8/1/2020. She has a history of breast cancer, CAD s/p PCI (4/2018), PAF, CHF, HTN, HLD, MS, fibromyalgia, IBS, GERD, esophageal perforation, and malnutrition who presents with progressive generalized weakness x 3-4 " "weeks, culminating in fall at home today without significant injury.  Noted to be hypokalemic on evaluation (K 2.5).\"   Precautions/Limitations fall precautions   Weight-Bearing Status - LUE full weight-bearing   Weight-Bearing Status - RUE full weight-bearing   Weight-Bearing Status - LLE full weight-bearing   Weight-Bearing Status - RLE full weight-bearing   Heart Disease Risk Factors Age;Medical history;Lack of physical activity   General Observations Pt seated EOB throughout, pleasant and agreeable to therapy, but fatigued following PT session.   General Info Comments Activity: Up with assist   Cognitive Status Examination   Orientation orientation to person, place and time   Level of Consciousness alert   Follows Commands (Cognition) WNL   Memory impaired  (Pt with delayed answers, word-finding difficulty at times. )   Visual Perception   Visual Perception Wears glasses   Sensory Examination   Sensory Comments Reports no deficits   Pain Assessment   Patient Currently in Pain No   Integumentary/Edema   Integumentary/Edema Comments No deficits noted   Range of Motion (ROM)   ROM Comment LUE ROM WFL, R shoulder flexion 90 deg   Strength   Strength Comments Grossly 4/5 in all planes, shoulder flexion not assessed   Hand Strength   Hand Strength Comments 4+/5, WFL for ADLs   Coordination   Coordination Comments No deficits noted during ADLs   Transfer Skill: Sit to Stand   Level of [place]: Sit/Stand stand-by assist   Physical Assist/Nonphysical Assist: Sit/Stand supervision   Assistive Device for Transfer: Sit/Stand standard walker   Transfer Skill: Toilet Transfer   Level of [place]: Toilet stand-by assist   Physical Assist/Nonphysical Assist: Toilet supervision   Assistive Device grab bars;standard walker   Toileting   Level of [place]: Toilet independent   Grooming   Level of [place]: Grooming stand-by assist  (while seated EOB)   Physical Assist/Nonphysical Assist: Grooming set-up required " "  Instrumental Activities of Daily Living (IADL)   Previous Responsibilities housekeeping;meal prep;laundry;shopping;yardwork;finances;medication management;driving   Activities of Daily Living Analysis   Impairments Contributing to Impaired Activities of Daily Living balance impaired;strength decreased;cognition impaired   General Therapy Interventions   Planned Therapy Interventions ADL retraining;IADL retraining;strengthening;progressive activity/exercise;home program guidelines   Clinical Impression   Criteria for Skilled Therapeutic Interventions Met yes, treatment indicated   OT Diagnosis Impaired ADL/IADL independence   Influenced by the following impairments fatigue, generalized weakness, mild cognitive impairment   Assessment of Occupational Performance 3-5 Performance Deficits   Identified Performance Deficits bathing, meal prep, housekeeping, driving   Clinical Decision Making (Complexity) Low complexity   Therapy Frequency 6x/week   Predicted Duration of Therapy Intervention (days/wks) 5 days   Anticipated Equipment Needs at Discharge   (TBD)   Anticipated Discharge Disposition Home with Home Therapy   Risks and Benefits of Treatment have been explained. Yes   Patient, Family & other staff in agreement with plan of care Yes   Clinical Impression Comments Pt presents today with fatigue, generalized weakness, and mild word-finding difficulties, all contributing to impaired ADL/IADL independence. Pt will benefit from skilled OT to address above impairments and maximize ADL performance.    [facility] AM-PAC  \"6 Clicks\" Daily Activity Inpatient Short Form   1. Putting on and taking off regular lower body clothing? 4 - None   2. Bathing (including washing, rinsing, drying)? 3 - A Little   3. Toileting, which includes using toilet, bedpan or urinal? 4 - None   4. Putting on and taking off regular upper body clothing? 4 - None   5. Taking care of personal grooming such as brushing teeth? 4 - None   6. " Eating meals? 4 - None   Daily Activity Raw Score (Score out of 24.Lower scores equate to lower levels of function) 23   Total Evaluation Time   Total Evaluation Time (Minutes) 10      DBT, Group therapy

## 2022-03-29 NOTE — ECT CONSULT NOTE - NSACTIVEVENT_PSY_ALL_CORE
Triggering events leading to humiliation, shame, and/or despair (e.g., Loss of relationship, financial or health status) (real or anticipated)/Legal problems/Current or pending social isolation

## 2022-03-29 NOTE — ECT CONSULT NOTE - NSECTACTIONMITIGATESUICRISK_PSY_ALL_CORE
Ms. Martinez is motivated to get better and is seeking ECT treatment to help her depression. She has the ability to ask for help and wants to start outpatient ECT.

## 2022-03-29 NOTE — ECT CONSULT NOTE - NSECTASSESSRECOMM_PSY_ALL_CORE
A course of ECT is indicated to target the severe depressive symptoms that Ms. Martinez is experiencing. It was explicitly discussed that ECT is not going to help with the chronic mild depression and the degree of response may be limited due to the psychosocial stressors and patient's coping strategies, which can be better addressed in psychotherapy. Medication alternatives (like lithium or combination of medications) were also discussed.   The patient encounter was from 9:30 am to 11:20 am      More than 50% of the time was spent in counselling and/or coordination of care, including but not limited to discussing with patient  risk and benefits of ECT, the usual trajectory of response with acute course of ECT, discussing informed consent for ECT, reviewing ECT fasting guidelines, reviewing the need for periodic history and physical and labwork. Patient was asked to obtain labwork (CBC, BMP), EKG and medical clearance.  She was asked to obtain clearance from her spine doctor as well.   Referring psychiatrist was contacted on 3/30/2022 and recommendations were discussed .

## 2022-03-29 NOTE — ECT CONSULT NOTE - DESCRIPTION
HTN, Migraine, Pituitary microadenoma, Recently 2 herniated disks in neck, (in addition to the Cervical spine fusion from C4-C7)

## 2022-03-29 NOTE — ECT CONSULT NOTE - NSECTMENTALSTATUSEXAM_PSY_ALL_CORE
Conscious, cooperative, alert.   No psychomotor agitation/retardation.   Good eye contact.   Speech: regular rate and rhythm,   Mood: "Depressed" Affect: appropriate, full range.   Thought Process: linear, goal directed   Thought Content: Passive death wish present. Suicidal ideation with chronic plan of poisoning present. Denies active suicidal intent. No homicidal ideation/intent/plan. No delusions   Perception: No hallucinations   Insight and Judgement: fair  Impulse Control: fair at this time.  MMSE: 28/29 (Patient missed 1 item on 3 word recall. Writer could not confirm the floor level of patient's house)

## 2022-03-29 NOTE — ECT CONSULT NOTE - NSECTTIMEACTIVITIES_PSY_ALL_CORE
In addition to evaluation, counselling and/or coordination of care, including but not limited to discussing with patient  risk and benefits of ECT, the usual trajectory of response with acute course of ECT, discussing informed consent for ECT, reviewing ECT fasting guidelines, reviewing the need for periodic history and physical and labwork. Patient was asked to obtain labwork (CBC, BMP), EKG and medical clearance.

## 2022-03-29 NOTE — ECT CONSULT NOTE - DETAILS
Mother-Depression, Father-OCD, Oldest daughter has some psychiatric issues. Has also been told borderline personality disorder. No h/o suicide attempt She reports passive death wishes and suicidal ideation. “I wish I wasn’t around”. “I don’t want to kill myself”. Reports she has chronic ideation of killing herself with poisoning but denies any intent to hurt herself. She cites her children as the main protective factor.

## 2022-03-29 NOTE — ECT CONSULT NOTE - RISK ASSESSMENT
Ms. Martinez has several psychosocial stressors, treatment resistant depression, legal problems, multiple psychiatric hospitalizations and chronic suicidal ideation that increases her risk of acting on them.  She also has protective factors, that mitigates that risk: most important being her children (especially the younger ones), responsibility to family, pets. She does not have h/o of suicide attempt, substance abuse or access to Guns, no family h/o suicide attempt. She does not want to take any actions that can have long term impact on her children. She continues to follow up with her psychiatrist as well as attend group therapy. She is not at imminent risk of hurting herself.

## 2022-03-29 NOTE — ECT CONSULT NOTE - NSECTPASTTXTRIALSDETAILS_PSY_ALL_CORE
Zoloft, Prozac, Effexor, Cymbalta, Buspar, Wellbutrin, Trintillex, Rexulti, Lamictal, Seroquel, Latuda, Abilify, Gabapentin, Vyvanse, Strattera

## 2022-03-29 NOTE — ECT CONSULT NOTE - CURRENT MEDICATION
MEDICATIONS  (STANDING):    MEDICATIONS  (PRN):   Zoloft 50 mg, Wellbutrin 300 mg, Buspar 5 mg TID, Hydroxyzine 50 mg q hs, Hydrochlorthiazide 12. 5 mg, Motrin PRN for pain

## 2022-03-31 DIAGNOSIS — Z86.59 PERSONAL HISTORY OF OTHER MENTAL AND BEHAVIORAL DISORDERS: ICD-10-CM

## 2022-04-27 LAB — SARS-COV-2 RNA SPEC QL NAA+PROBE: SIGNIFICANT CHANGE UP

## 2022-04-28 VITALS
SYSTOLIC BLOOD PRESSURE: 153 MMHG | HEART RATE: 56 BPM | DIASTOLIC BLOOD PRESSURE: 80 MMHG | TEMPERATURE: 98 F | OXYGEN SATURATION: 99 % | RESPIRATION RATE: 14 BRPM

## 2022-04-28 LAB — SARS-COV-2 RNA SPEC QL NAA+PROBE: SIGNIFICANT CHANGE UP

## 2022-04-28 PROCEDURE — 90870 ELECTROCONVULSIVE THERAPY: CPT

## 2022-04-28 RX ORDER — MIDAZOLAM HYDROCHLORIDE 1 MG/ML
2 INJECTION, SOLUTION INTRAMUSCULAR; INTRAVENOUS ONCE
Refills: 0 | Status: DISCONTINUED | OUTPATIENT
Start: 2022-04-28 | End: 2022-04-28

## 2022-04-28 NOTE — ECT TREATMENT NOTE - NSECTCOMMENTS_PSY_ALL_CORE
1st treatment.  No change in medical or psychiatric symptoms since consult/clearance.  Still depressed, passive but no active SI.  Low energy.  Agrees to acute course of ECT.

## 2022-04-29 DIAGNOSIS — F33.2 MAJOR DEPRESSIVE DISORDER, RECURRENT SEVERE WITHOUT PSYCHOTIC FEATURES: ICD-10-CM

## 2022-04-29 DIAGNOSIS — Z86.59 PERSONAL HISTORY OF OTHER MENTAL AND BEHAVIORAL DISORDERS: ICD-10-CM

## 2022-05-03 LAB — SARS-COV-2 RNA SPEC QL NAA+PROBE: SIGNIFICANT CHANGE UP

## 2022-05-03 PROCEDURE — 90870 ELECTROCONVULSIVE THERAPY: CPT

## 2022-05-03 NOTE — ECT TREATMENT NOTE - NSECTCOMMENTS_PSY_ALL_CORE
Pt is calm and cooperative No change in her psychiatric symptoms yet, but her  noticed that she did some crafts for the first time for a long time. Passive SI still present bu she denies intent or plan.

## 2022-05-05 LAB — SARS-COV-2 RNA SPEC QL NAA+PROBE: SIGNIFICANT CHANGE UP

## 2022-05-05 PROCEDURE — 90870 ELECTROCONVULSIVE THERAPY: CPT

## 2022-05-05 RX ORDER — MIDAZOLAM HYDROCHLORIDE 1 MG/ML
2 INJECTION, SOLUTION INTRAMUSCULAR; INTRAVENOUS ONCE
Refills: 0 | Status: DISCONTINUED | OUTPATIENT
Start: 2022-05-05 | End: 2022-05-05

## 2022-05-05 RX ADMIN — MIDAZOLAM HYDROCHLORIDE 2 MILLIGRAM(S): 1 INJECTION, SOLUTION INTRAMUSCULAR; INTRAVENOUS at 15:53

## 2022-05-05 NOTE — ECT TREATMENT NOTE - NSECTCOMMENTS_PSY_ALL_CORE
Pt is calm and cooperative. Denies any SI, but thinks that her mood is the same. She states that her  thinks she is a bit better. Reprots significant tiredness 1 or 2 days after the tx. Will cont acute management.

## 2022-05-10 LAB — SARS-COV-2 RNA SPEC QL NAA+PROBE: SIGNIFICANT CHANGE UP

## 2022-05-10 PROCEDURE — 90870 ELECTROCONVULSIVE THERAPY: CPT

## 2022-05-10 RX ORDER — MIDAZOLAM HYDROCHLORIDE 1 MG/ML
2 INJECTION, SOLUTION INTRAMUSCULAR; INTRAVENOUS ONCE
Refills: 0 | Status: DISCONTINUED | OUTPATIENT
Start: 2022-05-10 | End: 2022-05-10

## 2022-05-10 RX ADMIN — MIDAZOLAM HYDROCHLORIDE 2 MILLIGRAM(S): 1 INJECTION, SOLUTION INTRAMUSCULAR; INTRAVENOUS at 16:24

## 2022-05-10 NOTE — ECT TREATMENT NOTE - NSECTCOMMENTS_PSY_ALL_CORE
Pt calm and pleasant.  States mood is a little better but struggling with memory issues.  Able to tell me she enjoyed mother's day and family ordered food but not able to remember what they ate.  Does remember getting a kayak as a gift.  Able to tell me name of school her son's attend and describes their personalities as very different from each other.  Spoke to  who does note worsening memory. He's also aware this can be normal side effect from ECT.   He agrees with plan that since she is having such cog issue now (he agrees she asks same questions over and over), will hold 2nd appt this week.  He adds that her mood is more labile/emotional where she cries a lot but overall looks less depressed.  Will return early next week and likely return to 2x/week.  If cog issues continue, may need to consider switching to RUL.

## 2022-05-16 PROCEDURE — 90870 ELECTROCONVULSIVE THERAPY: CPT

## 2022-05-16 RX ORDER — MIDAZOLAM HYDROCHLORIDE 1 MG/ML
2 INJECTION, SOLUTION INTRAMUSCULAR; INTRAVENOUS ONCE
Refills: 0 | Status: DISCONTINUED | OUTPATIENT
Start: 2022-05-16 | End: 2022-05-16

## 2022-05-16 RX ADMIN — MIDAZOLAM HYDROCHLORIDE 2 MILLIGRAM(S): 1 INJECTION, SOLUTION INTRAMUSCULAR; INTRAVENOUS at 14:14

## 2022-05-16 NOTE — ECT TREATMENT NOTE - NSECTCOMMENTS_PSY_ALL_CORE
reports that patient is doing much better since last tx, has returned to usual activities, but still has significant cognitive impact, e.g. asking several times why their stairs were modified (he had a fall there recently). Patient stated that she feels much relief from her depression since last treatment, not completely free of depression and anxiety, still with some guilt and worthlessness. C/o cognitive impact, forgetting conversations with her children. No suicidal ideation. Patient unable to return later this week due to family obligations. Suggest re-evaluation in one week for possible return to 2x/week acute.

## 2022-05-17 LAB — SARS-COV-2 RNA SPEC QL NAA+PROBE: SIGNIFICANT CHANGE UP

## 2022-05-23 PROCEDURE — 90870 ELECTROCONVULSIVE THERAPY: CPT

## 2022-05-23 RX ORDER — MIDAZOLAM HYDROCHLORIDE 1 MG/ML
2 INJECTION, SOLUTION INTRAMUSCULAR; INTRAVENOUS ONCE
Refills: 0 | Status: DISCONTINUED | OUTPATIENT
Start: 2022-05-23 | End: 2022-05-23

## 2022-05-23 RX ADMIN — MIDAZOLAM HYDROCHLORIDE 2 MILLIGRAM(S): 1 INJECTION, SOLUTION INTRAMUSCULAR; INTRAVENOUS at 08:30

## 2022-05-23 NOTE — ECT TREATMENT NOTE - NSECTCOMMENTS_PSY_ALL_CORE
Patient reported no changes in mood since last treatment, feels out-of-sorts from cognitive impact and feels it may be interfering with ability to appreciate improvement. C/o insomnia last few days. Suggest another weekly acute tx to allow for cognitive recovery; re-evaluate interval subsequently.

## 2022-05-31 PROCEDURE — 90870 ELECTROCONVULSIVE THERAPY: CPT

## 2022-05-31 RX ORDER — MIDAZOLAM HYDROCHLORIDE 1 MG/ML
2 INJECTION, SOLUTION INTRAMUSCULAR; INTRAVENOUS ONCE
Refills: 0 | Status: DISCONTINUED | OUTPATIENT
Start: 2022-05-31 | End: 2022-05-31

## 2022-05-31 RX ADMIN — MIDAZOLAM HYDROCHLORIDE 2 MILLIGRAM(S): 1 INJECTION, SOLUTION INTRAMUSCULAR; INTRAVENOUS at 14:15

## 2022-05-31 NOTE — ECT TREATMENT NOTE - NSECTCOMMENTS_PSY_ALL_CORE
Patient reports, "I had a really bad week" and attributes this to forgetting to take her psychiatric medication and HCTZ all week (cites memory problems with ECT as contributor). Passive suicidal ideation present without plan or intent, patient refused voluntary hospitalization. Restarted her Rx today. Agrees with maintaining weekly acute treatments while cognition recovers.

## 2022-06-07 LAB — SARS-COV-2 RNA SPEC QL NAA+PROBE: SIGNIFICANT CHANGE UP

## 2022-06-08 PROCEDURE — 90870 ELECTROCONVULSIVE THERAPY: CPT

## 2022-06-08 RX ORDER — MIDAZOLAM HYDROCHLORIDE 1 MG/ML
2 INJECTION, SOLUTION INTRAMUSCULAR; INTRAVENOUS ONCE
Refills: 0 | Status: DISCONTINUED | OUTPATIENT
Start: 2022-06-08 | End: 2022-06-08

## 2022-06-08 RX ADMIN — MIDAZOLAM HYDROCHLORIDE 2 MILLIGRAM(S): 1 INJECTION, SOLUTION INTRAMUSCULAR; INTRAVENOUS at 09:53

## 2022-06-08 NOTE — ECT TREATMENT NOTE - NSECTCOMMENTS_PSY_ALL_CORE
States that her mood was mostly good, except for the weekend, when she felt more down. Denies any SI. Treatment related memory issues, states that it is better that previously. Will have one more treatment as acute. (early next week) and switch to maintenance.

## 2022-06-13 LAB — SARS-COV-2 RNA SPEC QL NAA+PROBE: SIGNIFICANT CHANGE UP

## 2022-06-14 PROCEDURE — 90870 ELECTROCONVULSIVE THERAPY: CPT

## 2022-06-14 RX ORDER — MIDAZOLAM HYDROCHLORIDE 1 MG/ML
2 INJECTION, SOLUTION INTRAMUSCULAR; INTRAVENOUS ONCE
Refills: 0 | Status: DISCONTINUED | OUTPATIENT
Start: 2022-06-14 | End: 2022-06-14

## 2022-06-14 RX ADMIN — MIDAZOLAM HYDROCHLORIDE 2 MILLIGRAM(S): 1 INJECTION, SOLUTION INTRAMUSCULAR; INTRAVENOUS at 11:10

## 2022-06-14 RX ADMIN — MIDAZOLAM HYDROCHLORIDE 2 MILLIGRAM(S): 1 INJECTION, SOLUTION INTRAMUSCULAR; INTRAVENOUS at 11:06
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
## 2022-06-17 ENCOUNTER — NON-APPOINTMENT (OUTPATIENT)
Age: 47
End: 2022-06-17

## 2022-06-21 PROCEDURE — 90870 ELECTROCONVULSIVE THERAPY: CPT

## 2022-06-21 RX ORDER — MIDAZOLAM HYDROCHLORIDE 1 MG/ML
2 INJECTION, SOLUTION INTRAMUSCULAR; INTRAVENOUS ONCE
Refills: 0 | Status: DISCONTINUED | OUTPATIENT
Start: 2022-06-21 | End: 2022-06-21

## 2022-06-21 RX ADMIN — MIDAZOLAM HYDROCHLORIDE 2 MILLIGRAM(S): 1 INJECTION, SOLUTION INTRAMUSCULAR; INTRAVENOUS at 15:05

## 2022-06-21 NOTE — ECT TREATMENT NOTE - NSECTCOMMENTS_PSY_ALL_CORE
States she is "ok".  Has "more good days than bad". Supports it is better than preECT.   Still has periods of passive SI.  Able to enjoy weekend with family.  Memory improved over few weeks ago.   agrees with above.  Pt did not make it to see outpt psych MD yesterday as she had CP but was told in ER it was GI or musculoskeletal and noncardiac in origin (EKG stable now).    will meet with psych MD on Th with pt and let MD know we are wondering if pt is close to baseline in which will taper to q2 weeks or if pt if far below baseline returning to 2x/week for a few more week (if cognition tolerates).    will ask MD to call ECT program to give impression to assist with ECT scheduling.    A new treatment plan is needed if tapered to q2 weeks.

## 2022-06-24 ENCOUNTER — NON-APPOINTMENT (OUTPATIENT)
Age: 47
End: 2022-06-24

## 2022-06-28 LAB — SARS-COV-2 RNA SPEC QL NAA+PROBE: SIGNIFICANT CHANGE UP

## 2022-06-28 PROCEDURE — 90870 ELECTROCONVULSIVE THERAPY: CPT

## 2022-06-28 RX ORDER — MIDAZOLAM HYDROCHLORIDE 1 MG/ML
2 INJECTION, SOLUTION INTRAMUSCULAR; INTRAVENOUS ONCE
Refills: 0 | Status: DISCONTINUED | OUTPATIENT
Start: 2022-06-28 | End: 2022-06-28

## 2022-06-28 RX ADMIN — MIDAZOLAM HYDROCHLORIDE 2 MILLIGRAM(S): 1 INJECTION, SOLUTION INTRAMUSCULAR; INTRAVENOUS at 12:17

## 2022-06-28 NOTE — ECT PRE-PROCEDURE CHECKLIST - NSMEDIMPLDEVICEOTHERFT_PSY_ALL_CORE
spinal fusion with hardware

## 2022-06-28 NOTE — ECT TREATMENT NOTE - NSECTCOMMENTS_PSY_ALL_CORE
Pt reports she is still experiencing sig sx of depression w/ intermittent low mod, anhedonia, neurovegetative sx, passive SI.  no active SI/plan/intent/acts of furtherance. spoke w/ outpt psychiatrist who rec'd 2x/wk tx for rescue and pt amenable.  endorses mod memory difficulties that are distressing to the pt and a potential limiting factor in being able to complete a course of tx.  otherwise pt offered no further complaints.

## 2022-06-28 NOTE — ECT PRE-PROCEDURE CHECKLIST - NSMEDIMPLDEVICE_PSY_ALL_CORE
Other (please specify)

## 2022-06-30 PROCEDURE — 90870 ELECTROCONVULSIVE THERAPY: CPT

## 2022-06-30 RX ORDER — MIDAZOLAM HYDROCHLORIDE 1 MG/ML
2 INJECTION, SOLUTION INTRAMUSCULAR; INTRAVENOUS ONCE
Refills: 0 | Status: DISCONTINUED | OUTPATIENT
Start: 2022-06-30 | End: 2022-06-30

## 2022-06-30 RX ADMIN — MIDAZOLAM HYDROCHLORIDE 2 MILLIGRAM(S): 1 INJECTION, SOLUTION INTRAMUSCULAR; INTRAVENOUS at 16:02

## 2022-06-30 NOTE — ECT TREATMENT NOTE - NSECTCOMMENTS_PSY_ALL_CORE
Patient states she feels a little better than last week.  More motivation/energy to do things like crafting and socializing.  Reviewed course, pt tells MD she tapered to weekly due to memory issues not because she was stable so this return to 2x/week was to see how much more we can get out of ECT.  Wishes to continue on 2x/week into next week. Cont eval for plateau and then taper frequency.

## 2022-07-01 LAB — SARS-COV-2 RNA SPEC QL NAA+PROBE: SIGNIFICANT CHANGE UP

## 2022-07-05 LAB — SARS-COV-2 RNA SPEC QL NAA+PROBE: SIGNIFICANT CHANGE UP

## 2022-07-05 PROCEDURE — 90870 ELECTROCONVULSIVE THERAPY: CPT

## 2022-07-05 RX ORDER — MIDAZOLAM HYDROCHLORIDE 1 MG/ML
2 INJECTION, SOLUTION INTRAMUSCULAR; INTRAVENOUS ONCE
Refills: 0 | Status: DISCONTINUED | OUTPATIENT
Start: 2022-07-05 | End: 2022-07-05

## 2022-07-05 RX ADMIN — MIDAZOLAM HYDROCHLORIDE 2 MILLIGRAM(S): 1 INJECTION, SOLUTION INTRAMUSCULAR; INTRAVENOUS at 15:03

## 2022-07-05 NOTE — ECT TREATMENT NOTE - NSECTCOMMENTS_PSY_ALL_CORE
Ms. Martinez  reports that she is feeling better after ECT. Sleep: Normal, appetite: good. Mood; euthymic, Affect: appropriate. Reports Energy is low. Rates her depression as 4/10 with 10 being the worst depression. She feels that she is definitely better compared to last week. Endorses occasional passive death wish. Denies active suicidal ideation/intent/plan.      also reports that she is significantly better. No longer obsessing over body image, is able to go out, exercise, less depressed.     Risk vs benefits discussed with patient and . Recommended continuing twice a week as long as there is incremental improvement.

## 2022-07-08 LAB — SARS-COV-2 RNA SPEC QL NAA+PROBE: SIGNIFICANT CHANGE UP

## 2022-07-08 PROCEDURE — 90870 ELECTROCONVULSIVE THERAPY: CPT

## 2022-07-08 RX ORDER — MIDAZOLAM HYDROCHLORIDE 1 MG/ML
2 INJECTION, SOLUTION INTRAMUSCULAR; INTRAVENOUS ONCE
Refills: 0 | Status: DISCONTINUED | OUTPATIENT
Start: 2022-07-08 | End: 2022-07-08

## 2022-07-08 RX ADMIN — MIDAZOLAM HYDROCHLORIDE 2 MILLIGRAM(S): 1 INJECTION, SOLUTION INTRAMUSCULAR; INTRAVENOUS at 14:25

## 2022-07-08 NOTE — ECT TREATMENT NOTE - NSECTCOMMENTS_PSY_ALL_CORE
Ms. Martinez reports that she has noticed significant improvement in her mood. She went to Gym today morning and feels happy about it. She rates her depression as 4/10 with 10 being the worst depression. Sleep is normal (was disturbed yday), Appetite: good. Energy: improved. Continues to have occasional passive death wishes. Denies active suicidal ideation/intent/plan.     Recommended to plan for twice a week appointments. She might be approaching a plateau, in which case the interval can be increased.  should be able to give the feedback.

## 2022-07-11 LAB — SARS-COV-2 RNA SPEC QL NAA+PROBE: SIGNIFICANT CHANGE UP

## 2022-07-11 PROCEDURE — 90870 ELECTROCONVULSIVE THERAPY: CPT

## 2022-07-11 RX ORDER — MIDAZOLAM HYDROCHLORIDE 1 MG/ML
2 INJECTION, SOLUTION INTRAMUSCULAR; INTRAVENOUS ONCE
Refills: 0 | Status: DISCONTINUED | OUTPATIENT
Start: 2022-07-11 | End: 2022-07-11

## 2022-07-11 RX ADMIN — MIDAZOLAM HYDROCHLORIDE 2 MILLIGRAM(S): 1 INJECTION, SOLUTION INTRAMUSCULAR; INTRAVENOUS at 15:20

## 2022-07-11 NOTE — ECT TREATMENT NOTE - NSECTCOMMENTS_PSY_ALL_CORE
Patient reported some improvement since last treatment, "I had a really good weekend," denies suicidal ideation for last three days, energy is still variable, c/o insomnia; rates mood 3/10 (10 worst). Denied current significant cognitive impact. As patient is still improving and still c/o depressive sx, suggest continued acute tx.  confirmed above presentation by phone, agrees patient has not plateaued, agrees with 2x/week tx. Suggested patient see her primary psychiatrist soon to help gauge improvement/time transition to maintenance.

## 2022-07-14 PROCEDURE — 90870 ELECTROCONVULSIVE THERAPY: CPT

## 2022-07-14 RX ORDER — MIDAZOLAM HYDROCHLORIDE 1 MG/ML
2 INJECTION, SOLUTION INTRAMUSCULAR; INTRAVENOUS ONCE
Refills: 0 | Status: DISCONTINUED | OUTPATIENT
Start: 2022-07-14 | End: 2022-07-14

## 2022-07-14 RX ADMIN — MIDAZOLAM HYDROCHLORIDE 2 MILLIGRAM(S): 1 INJECTION, SOLUTION INTRAMUSCULAR; INTRAVENOUS at 14:15

## 2022-07-14 NOTE — ECT PRE-PROCEDURE CHECKLIST - 1.
patient had chest pain yesterday , went to ER , blood works and EKG done , clearance seen by  chu Cat to treat the patient
patient had chest pain yesterday , went to ER , blood works and EKG done , clearance seen by  chu Cta to treat the patient
patient had chest pain yesterday , went to ER , blood works and EKG done , clearance seen by  chu Cat to treat the patient

## 2022-07-14 NOTE — ECT TREATMENT NOTE - NSECTCOMMENTS_PSY_ALL_CORE
Pt states she feels better this week.  Enjoyed going to [employer] over last week.  Sleeping and eating well.   Still with some cognitive issues (forgot where the 7/[employer] was) but cog improved since a few weeks ago.  Agrees with plan to return to weekly frequency.   Outpt MD called us yesterday to agree with plan and will send treatment plan soon.

## 2022-07-19 ENCOUNTER — NON-APPOINTMENT (OUTPATIENT)
Age: 47
End: 2022-07-19

## 2022-07-22 PROCEDURE — 90870 ELECTROCONVULSIVE THERAPY: CPT

## 2022-07-22 RX ORDER — FLUMAZENIL 0.1 MG/ML
0.2 VIAL (ML) INTRAVENOUS ONCE
Refills: 0 | Status: DISCONTINUED | OUTPATIENT
Start: 2022-07-22 | End: 2023-01-26

## 2022-07-22 RX ORDER — MIDAZOLAM HYDROCHLORIDE 1 MG/ML
2 INJECTION, SOLUTION INTRAMUSCULAR; INTRAVENOUS ONCE
Refills: 0 | Status: DISCONTINUED | OUTPATIENT
Start: 2022-07-22 | End: 2022-07-22

## 2022-07-22 RX ADMIN — MIDAZOLAM HYDROCHLORIDE 2 MILLIGRAM(S): 1 INJECTION, SOLUTION INTRAMUSCULAR; INTRAVENOUS at 16:34

## 2022-07-22 NOTE — ECT TREATMENT NOTE - NSCGISEVERILLNESS_PSY_ALL_CORE
3 = Mildly ill – clearly established symptoms with minimal, if any, distress or difficulty in social and occupational function
2 = Borderline mentally ill – subtle or suspected pathology
4 = Moderately ill – overt symptoms causing noticeable, but modest, functional impairment or distress; symptom level may warrant medication
4 = Moderately ill – overt symptoms causing noticeable, but modest, functional impairment or distress; symptom level may warrant medication
3 = Mildly ill – clearly established symptoms with minimal, if any, distress or difficulty in social and occupational function
4 = Moderately ill – overt symptoms causing noticeable, but modest, functional impairment or distress; symptom level may warrant medication
2 = Borderline mentally ill – subtle or suspected pathology
2 = Borderline mentally ill – subtle or suspected pathology

## 2022-07-22 NOTE — ECT TREATMENT NOTE - NSECTCOMMENTS_PSY_ALL_CORE
Today is her first weekly session. She states that she's been doing better. Rates her depression as 3/10. Denies any suicidal thoughts or plan. Has been busy going to the gym at 4 am and then going back home and taking care of her kids, dogs, mother and all other house errands. Still with some memory issues but was educated that those will improve over the next few weeks with sessions beings spaced out.

## 2022-07-22 NOTE — ECT TREATMENT NOTE - NSCGIIMPROVESX_PSY_ALL_CORE
1 - Very much improved - nearly all better; good level of functioning; minimal symptoms; represents a very substantial change
2 = Much improved - notably better with signficant reduction of symptoms; increase in the level of functioning but some symptoms remain
3 = Minimally improved - slightly better with little or no clinically meaningful reduction of symptoms.  Represents very little change in basic clinical status, level of care, or functional capacity.
2 = Much improved - notably better with signficant reduction of symptoms; increase in the level of functioning but some symptoms remain
1 - Very much improved - nearly all better; good level of functioning; minimal symptoms; represents a very substantial change
3 = Minimally improved - slightly better with little or no clinically meaningful reduction of symptoms.  Represents very little change in basic clinical status, level of care, or functional capacity.
2 = Much improved - notably better with signficant reduction of symptoms; increase in the level of functioning but some symptoms remain
4 = No change - symptoms remain essentially unchanged
4 = No change - symptoms remain essentially unchanged
2 = Much improved - notably better with signficant reduction of symptoms; increase in the level of functioning but some symptoms remain

## 2022-07-28 LAB — SARS-COV-2 RNA SPEC QL NAA+PROBE: SIGNIFICANT CHANGE UP

## 2022-07-29 PROCEDURE — 90870 ELECTROCONVULSIVE THERAPY: CPT

## 2022-07-29 RX ORDER — MIDAZOLAM HYDROCHLORIDE 1 MG/ML
2 INJECTION, SOLUTION INTRAMUSCULAR; INTRAVENOUS ONCE
Refills: 0 | Status: DISCONTINUED | OUTPATIENT
Start: 2022-07-29 | End: 2022-07-29

## 2022-07-29 RX ADMIN — MIDAZOLAM HYDROCHLORIDE 2 MILLIGRAM(S): 1 INJECTION, SOLUTION INTRAMUSCULAR; INTRAVENOUS at 15:25

## 2022-07-29 NOTE — ECT AMBULATORY DISCHARGE PLAN - NSPOSTECTADDINSTRFUCARE_PSY_ALL_CORE
Covid/ECT teachings re-enforced
Covid/ECT teachings re-enforced
treatment plan
treatment plan
TREATMENT PLAN DUE
TREATMENT PLAN DUE
Please obtain COVID test 72 hours prior to ECT treatment

## 2022-07-29 NOTE — ECT TREATMENT NOTE - NSECTCOMMENTS_PSY_ALL_CORE
Today is the second weekly treatment. Ms. Martinez reports that she had a good week and was feeling "not depressed. Haven't felt like this for a long time". However from yesterday, she started to feel depressed. Denies any triggers. Questions whether anxiety about anesthesia and coming for ECT might have triggered it. Over last 2 days, rates her depression as 8/10, with low energy, sleep disturbance and passive death wish--"tired of living like this". Denies active suicidal ideation/intent/plan. Affect: bright, appropriate, smiling appropriately.    Recommend continuing Weekly ECT. If patient has difficulty maintaining euthymia at weekly ECT, may consider augmentation with Lithium (was discussed with Dr. Hamlin few weeks ago).     We will need a new treatment plan from Dr. Hamlin. (once he is back in office). Dr. Hamlin had agreed with the plan earlier and was on vacation.

## 2022-08-01 ENCOUNTER — NON-APPOINTMENT (OUTPATIENT)
Age: 47
End: 2022-08-01

## 2022-08-04 PROCEDURE — 90870 ELECTROCONVULSIVE THERAPY: CPT

## 2022-08-04 RX ORDER — MIDAZOLAM HYDROCHLORIDE 1 MG/ML
2 INJECTION, SOLUTION INTRAMUSCULAR; INTRAVENOUS ONCE
Refills: 0 | Status: DISCONTINUED | OUTPATIENT
Start: 2022-08-04 | End: 2022-08-04

## 2022-08-04 RX ADMIN — MIDAZOLAM HYDROCHLORIDE 2 MILLIGRAM(S): 1 INJECTION, SOLUTION INTRAMUSCULAR; INTRAVENOUS at 11:46

## 2022-08-04 NOTE — ECT AMBULATORY DISCHARGE PLAN - POST OP PHONE #
[phone]
[phone]
[phone]
[phone]
[phone]
[phone]
[phone]
[phone]

## 2022-08-04 NOTE — ECT TREATMENT NOTE - NSECTCOMMENTS_PSY_ALL_CORE
3rd  weekly treatment.  States overall week was good with ok sleep and appetite. Enjoying time with family.  States more anxious today likely as she was coming for treatment.  No SI.  Energy and concentration close to normal.  Continue weekly ECT.  If stable at next visit, consider further taper.

## 2022-08-04 NOTE — ECT TREATMENT NOTE - DETAILS
Passive SI
Patient reports passive death wishes over last 2 days. Denies active suicidal ideation/intent/plan
Patient reports passive death wishes over last 2 days. Denies active suicidal ideation/intent/plan
intermittent passive SI on "bad days" without intent or plan.
Endorses passive death wishes. Denies active suicidal ideation/intent/plan.
Passive SI

## 2022-08-09 ENCOUNTER — NON-APPOINTMENT (OUTPATIENT)
Age: 47
End: 2022-08-09

## 2022-08-12 PROCEDURE — 90870 ELECTROCONVULSIVE THERAPY: CPT

## 2022-08-12 RX ORDER — FLUMAZENIL 0.1 MG/ML
0.2 VIAL (ML) INTRAVENOUS ONCE
Refills: 0 | Status: DISCONTINUED | OUTPATIENT
Start: 2022-08-12 | End: 2023-01-26

## 2022-08-12 RX ORDER — MIDAZOLAM HYDROCHLORIDE 1 MG/ML
2 INJECTION, SOLUTION INTRAMUSCULAR; INTRAVENOUS ONCE
Refills: 0 | Status: DISCONTINUED | OUTPATIENT
Start: 2022-08-12 | End: 2022-08-12

## 2022-08-12 RX ADMIN — MIDAZOLAM HYDROCHLORIDE 2 MILLIGRAM(S): 1 INJECTION, SOLUTION INTRAMUSCULAR; INTRAVENOUS at 09:48

## 2022-08-12 NOTE — ECT TREATMENT NOTE - NSECTCOMMENTS_PSY_ALL_CORE
Today was the 4th weekly treatment. Sleep:normal, appetite: good. Energy: normal. Reports she has been exercising regularly. Rates her depression as 3-4/10 with 10 being the worst depression. Denies death wish/suicidal ideation/intent/plan. She has recently started a new medicaiton for weight reduction "Plentity"    Will increase the interval to q 2 weeks. She should return earlier if there is worsening of symptoms.

## 2022-08-22 ENCOUNTER — NON-APPOINTMENT (OUTPATIENT)
Age: 47
End: 2022-08-22

## 2022-08-25 VITALS
RESPIRATION RATE: 20 BRPM | DIASTOLIC BLOOD PRESSURE: 91 MMHG | HEART RATE: 80 BPM | OXYGEN SATURATION: 98 % | TEMPERATURE: 98 F | SYSTOLIC BLOOD PRESSURE: 138 MMHG

## 2022-08-25 PROCEDURE — 90870 ELECTROCONVULSIVE THERAPY: CPT

## 2022-08-25 RX ORDER — MIDAZOLAM HYDROCHLORIDE 1 MG/ML
2 INJECTION, SOLUTION INTRAMUSCULAR; INTRAVENOUS ONCE
Refills: 0 | Status: DISCONTINUED | OUTPATIENT
Start: 2022-08-25 | End: 2022-08-25

## 2022-08-25 RX ADMIN — MIDAZOLAM HYDROCHLORIDE 2 MILLIGRAM(S): 1 INJECTION, SOLUTION INTRAMUSCULAR; INTRAVENOUS at 11:49

## 2022-08-25 NOTE — ECT PRE-PROCEDURE CHECKLIST - INV PERIPH IV SIZE
22 gauge
22 gauge/1.0 in length

## 2022-08-25 NOTE — ECT TREATMENT NOTE - NSECTIMPPLAN_PSY_ALL_CORE
Assessment today offers no contraindications to continue plan of treatment with ECT.

## 2022-08-25 NOTE — ECT AMBULATORY DISCHARGE PLAN - NSPOSTECTSYMPTOMS_PSY_ALL_CORE
Excessive Diarrhea/Fever/Inability to tolerate liquids or foods/Increased irritability or sluggishness/Nausea and vomiting that does not stop/Pain not relieved by medications/Unable to urinate

## 2022-08-25 NOTE — ECT PRE-PROCEDURE CHECKLIST - NSECTCONSENT_PSY_ALL_CORE
BF acute 4/28/22  Anesthesia consent expires 7/28/22/yes
ECT [facility]  consent dated 6/21/22-23  Anesthesia consent expires 10/29/22/yes
ECT [facility]  consent dated 6/21/22  Aesthesia consent expires 10/29/22/yes
BF acute 4/28/22  Anesthesia consent expires 7/28/22/yes
BF acute  ECT consent dated 4/28/22  Anesthesia consent expires 7/28/22/yes
BF acute 4/28/22  Anesthesia consent expires 7/28/22/yes
BF acute 4/28/22  Anesthesia consent expires 7/28/22/yes
ECT [facility]  consent dated 6/21/22-23  Anesthesia consent expires 10/29/22/yes
BF acute 4/28/22  Anesthesia consent expires 7/28/22/yes
ECT [facility]  consent dated 6/21/22-23  Anesthesia consent expires 10/29/22/yes
BF acute 4/28/22  Anesthesia consent expires 7/28/22/yes
BF acute 4/28/22  anesthesia consent expires 7/28/22/yes
BF acute  ECT consent dated 4/28/22  Anesthesia consent expires 7/28/22/yes
BF acute 4/28/22  Anesthesia consent expires 7/28/22/yes
ECT [facility]  consent dated 6/21/22  Aesthesia consent expires 7/28/22/yes
BF acute 4/28/22  Anesthesia consent expires 7/28/22/yes
BF acute 4/28/22  Anesthesia consent expires 7/28/22/yes

## 2022-08-25 NOTE — ECT AMBULATORY DISCHARGE PLAN - NURSING SECTION COMPLETE
Patient/Caregiver provided printed discharge information.

## 2022-08-25 NOTE — ECT PRE-PROCEDURE CHECKLIST - NSPROEXTENSOFSELF_PSY_ALL_CORE
none
Sun glasses/eyeglasses
none
sun glasses/ cell phone/eyeglasses
none

## 2022-08-25 NOTE — ECT TREATMENT NOTE - NSICDXBHSECONDARYDX_PSY_ALL_CORE
H/O borderline personality disorder   Z86.59  

## 2022-08-25 NOTE — ECT TREATMENT NOTE - NSECTOTHERCOMMENT_PSY_ALL_CORE
h/o C-spine fusion

## 2022-08-25 NOTE — ECT PRE-PROCEDURE CHECKLIST - NSPROEDAREADYLEARN_GEN_A_NUR
acuteness of illness

## 2022-08-25 NOTE — ECT AMBULATORY DISCHARGE PLAN - NSPOSTECTPOSSCOMP_PSY_ALL_CORE
Headache, nausea, general body aches are common experiences after this treatment.  These may be treated with over-the-counter pain medication.

## 2022-08-25 NOTE — ECT PRE-PROCEDURE CHECKLIST - TO WHOM
Procedural Room RN

## 2022-08-25 NOTE — ECT AMBULATORY DISCHARGE PLAN - NSPOSTECTRESTRIC_PSY_ALL_CORE
Drive a car, operate power tools or machinery./Drink alcohol, beer, or wine./Make important personal and business decisions./If you have had any type of sedation, you may experience lightheadedness, dizziness, or sleepiness following your procedure.  A responsible adult should stay with you for at least 24 hours following your procedure.

## 2022-08-25 NOTE — ECT AMBULATORY DISCHARGE PLAN - NSDCPEFALRISK_GEN_ALL_CORE
For information on Fall & Injury Prevention, visit: https://www.[facility]/news/fall-prevention-protects-and-maintains-health-and-mobility OR  https://www.[facility]/news/fall-prevention-tips-to-avoid-injury OR  https://www.cdc.gov/steadi/patient.html
For information on Fall & Injury Prevention, visit: https://www.[facility]/news/fall-prevention-protects-and-maintains-health-and-mobility OR  https://www.[facility]/news/fall-prevention-tips-to-avoid-injury OR  https://www.cdc.gov/steadi/patient.html
For information on Fall & Injury Prevention, visit: https://www.[facility]/news/fall-prevention-protects-and-maintains-health-and-mobility OR  https://www.[facility]/news/fall-prevention-tips-to-avoid-injury OR  https://www.cdc.gov/steadi/patient.html
For information on Fall & Injury Prevention, visit: https://www.[facility]/news/fall-prevention-protects-and-maintains-health-and-mobility OR  https://www.[facility]/news/fall-prevention-tips-to-avoid-injury OR  https://www.cdc.gov/steadi/patient.html
For information on Fall & Injury Prevention, visit: https://www.[facility]/news/fall-prevention-protects-and-maintains-health-and-mobility OR  https://www.[facility]/news/fall-prevention-tips-to-avoid-injury OR  https://www.cdc.gov/steadi/patient.html
For information on Fall & Injury Prevention, visit: https://www.[facility]/news/fall-prevention-protects-and-maintains-health-and-mobility OR  https://www.[facility]/news/fall-prevention-tips-to-avoid-injury OR  https://www.cdc.gov/steadi/patient.html
For information on Fall & Injury Prevention, visit: https://www.[facility]/news/fall-prevention-protects-and-maintains-health-and-mobility OR  https://www.[facility]/news/fall-prevention-tips-to-avoid-injury OR  https://www.cdc.gov/steadi/patient.html
For information on Fall & Injury Prevention, visit: https://www.[facility]/news/fall-prevention-protects-and-maintains-health-and-mobility OR  https://www.[facility]/news/fall-prevention-tips-to-avoid-injury OR  https://www.cdc.gov/steadi/patient.html
For information on Fall & Injury Prevention, visit: https://www.[facility]/news/fall-prevention-protects-and-maintains-health-and-mobility OR  https://www.[facility]/news/fall-prevention-tips-to-avoid-injury OR  https://www.cdc.gov/steadi/patient.html
For information on Fall & Injury Prevention, visit: https://www.[facility]/news/fall-prevention-protects-and-maintains-health-and-mobility OR  https://www.[facility]/news/fall-prevention-tips-to-avoid-injury OR  https://www.cdc.gov/steadi/patient.html
For information on Fall & Injury Prevention, visit: https://www.[facility]/news/fall-prevention-protects-and-maintains-health-and-mobility OR  https://www.[facility]/news/fall-prevention-tips-to-avoid-injury OR  https://www.cdc.gov/steadi/patient.html
For information on Fall & Injury Prevention, visit: https://www.[facility]/news/fall-prevention-protects-and-maintains-health-and-mobility OR  https://www.[facility]/news/fall-prevention-tips-to-avoid-injury OR  https://www.cdc.gov/steadi/patient.html
For information on Fall & Injury Prevention, visit: https://www.[facility]/news/fall-prevention-protects-and-maintains-health-and-mobility OR  https://www.[facility]/news/fall-prevention-tips-to-avoid-injury OR  https://www.cdc.gov/steadi/patient.html
For information on Fall & Injury Prevention, visit: https://www.Health system.[facility]/news/fall-prevention-protects-and-maintains-health-and-mobility OR  https://www.Health system.[facility]/news/fall-prevention-tips-to-avoid-injury OR  https://www.cdc.gov/steadi/patient.html
For information on Fall & Injury Prevention, visit: https://www.[facility]/news/fall-prevention-protects-and-maintains-health-and-mobility OR  https://www.[facility]/news/fall-prevention-tips-to-avoid-injury OR  https://www.cdc.gov/steadi/patient.html
For information on Fall & Injury Prevention, visit: https://www.[facility]/news/fall-prevention-protects-and-maintains-health-and-mobility OR  https://www.[facility]/news/fall-prevention-tips-to-avoid-injury OR  https://www.cdc.gov/steadi/patient.html
For information on Fall & Injury Prevention, visit: https://www.[facility]/news/fall-prevention-protects-and-maintains-health-and-mobility OR  https://www.[facility]/news/fall-prevention-tips-to-avoid-injury OR  https://www.cdc.gov/steadi/patient.html
For information on Fall & Injury Prevention, visit: https://www.[facility]/news/fall-prevention-protects-and-maintains-health-and-mobility OR  https://www.[facility]/news/fall-prevention-tips-to-avoid-injury OR  https://www.cdc.gov/steadi/patient.html
For information on Fall & Injury Prevention, visit: https://www.[facility]/news/fall-prevention-protects-and-maintains-health-and-mobility OR  https://www.[facility]/news/fall-prevention-tips-to-avoid-injury OR  https://www.cdc.gov/steadi/patient.html
For information on Fall & Injury Prevention, visit: https://www.[facility]/news/fall-prevention-protects-and-maintains-health-and-mobility OR  https://www.[facility]/news/fall-prevention-tips-to-avoid-injury OR  https://www.cdc.gov/steadi/patient.html
For information on Fall & Injury Prevention, visit: https://www.Health system.[facility]/news/fall-prevention-protects-and-maintains-health-and-mobility OR  https://www.Health system.[facility]/news/fall-prevention-tips-to-avoid-injury OR  https://www.cdc.gov/steadi/patient.html
For information on Fall & Injury Prevention, visit: https://www.[facility]/news/fall-prevention-protects-and-maintains-health-and-mobility OR  https://www.[facility]/news/fall-prevention-tips-to-avoid-injury OR  https://www.cdc.gov/steadi/patient.html

## 2022-08-25 NOTE — ECT AMBULATORY DISCHARGE PLAN - MODE OF TRANSPORTATION
Wheelchair/Stroller
Ambulatory
Wheelchair/Stroller

## 2022-08-25 NOTE — ECT PRE-PROCEDURE CHECKLIST - PERIPHERAL IV: INSERTION DATE
14-Jul-2022
08-Jul-2022
16-May-2022
14-Jun-2022
23-May-2022
31-May-2022
08-Jun-2022
10-May-2022
28-Jun-2022
12-Aug-2022
22-Jul-2022
30-Jun-2022
03-May-2022
25-Aug-2022
04-Aug-2022
28-Apr-2022

## 2022-08-25 NOTE — ECT PRE-PROCEDURE CHECKLIST - NSPROPTRIGHTSUPPORTPERSON_GEN_A_NUR
same name as above

## 2022-08-25 NOTE — ECT AMBULATORY DISCHARGE PLAN - NSPOSTECTPTCOND_PSY_ALL_CORE
Stable

## 2022-08-25 NOTE — ECT PRE-PROCEDURE CHECKLIST - NSPROEDALEARNPREF_GEN_A_NUR
verbal instruction/written material

## 2022-08-25 NOTE — ECT TREATMENT NOTE - NSECTTHYPULSE1ST_PSY_ALL_CORE
1 ms (DGX)

## 2022-08-25 NOTE — ECT TREATMENT NOTE - NSECTFOCPELUNGS_PSY_ALL_CORE
Unremarkable

## 2022-08-25 NOTE — ECT PRE-PROCEDURE CHECKLIST - ALLERGIES REVIEWED
done

## 2022-08-25 NOTE — ECT PRE-PROCEDURE CHECKLIST - SELECT TESTS ORDERED
COVID-19
Results in MD note/COVID-19
COVID-19
Results in MD note/COVID-19
COVID-19

## 2022-08-25 NOTE — ECT PRE-PROCEDURE CHECKLIST - PATIENT PROBLEMS/NEEDS
Patient expressed no known problems or needs

## 2022-08-25 NOTE — ECT PRE-PROCEDURE CHECKLIST - NSADULTACCOMPNAME_PSY_ALL_CORE
[place]

## 2022-08-25 NOTE — ECT PRE-PROCEDURE CHECKLIST - ASSESSMENT, HISTORY & PHYSICAL COMPLETED AND ON MEDICAL RECORD
H&P signed by Colby Sandoval DO at 10/02/18 01:51 PM      Author:  Colby Sandoval DO Service:  (none) Author Type:  Physician     Filed:  10/02/18 01:51 PM Encounter Date:  10/2/2018 Status:  Signed     :  Colby Sandoval DO (Physician)                                                                 [address] [address]     NAME:  MANJIT FORD      ADMISSION DATE:  10/01/2018     PHYSICIAN:  Colby Sandoval D.O.     ROOM:  [facility]     YOB: 1955     MED REC#:  [id number]       ACCT #:  [id number]                               HISTORY AND PHYSICAL     DREYER MRN:  [id number]     PRIMARY CARE PHYSICIAN:  Santos Simmons M.D.     History obtained through interview of patient along with review of  outpatient medical records.     CHIEF COMPLAINT:  Chest pressure.     HISTORY OF PRESENT ILLNESS:  This is a 63-year-old female with past  medical history of hypertension, hyperlipidemia, diabetes mellitus,  coronary artery disease with prior cardiac stenting in 2005, presents  with complaints of chest pressure.  The patient states over the last 2  weeks, she has had approximately 3 to 4 episodes where she feels that  her heart is beating quite fast.  She also describes a pressure in the  center of her chest.  This has traveled up to her neck, lasts  approximately 20 to 30 minutes at a time.  There is no association with  exertion.  It occurred multiple times when she was sitting.  There was  no associated nausea or diaphoresis.  She felt maybe a little bit short  of breath.  There was no nausea.  There was no vomiting.  She again had  an episode yesterday, which did last for longer 30 minutes, which led  her to come to the emergency room.  She otherwise reports feeling well.  No fevers or chills.  No change in bowel or bladder habits.  In the  emergency room, chest x-ray was clear.  EKG 
and troponin are negative  and have been negative overnight.  Cardiology was consulted.  This  morning, she does report that she has mild pressure 2/10.     PAST MEDICAL HISTORY:  1.     Coronary artery disease.  In 2005, she had a PTCA stent to      her LAD.  On 2010, she had a Taxus stent to the LAD just      distal to the prior stent with PTCA to ostial diagonal stenosis.  2.     Diabetes mellitus.  3.     Hyperlipidemia.  4.     Herron's esophagus.  5.     Gastroesophageal reflux disease.  6.     Hypertension.  7.     History of shingles.  8.     Hemorrhoids.     PAST SURGICAL HISTORY:  Includes:  1.     Tubal ligation.  2.     Tonsillectomy.  3.     Lipoma removal.  4.     Anterior cervical diskectomy.     HOME MEDICATIONS:  1.     Omeprazole 40 mg daily.  2.     Farxiga 5 mg daily.  3.     Avapro 75 mg nightly.  4.     Metformin 1000 mg with breakfast.  5.     Carvedilol 3.125 mg b.i.d.  6.     Lovastatin 10 mg nightly.  7.     Aspirin 325 mg daily.  8.     black cohosh 1 tablet b.i.d.  9.     Vitamin D daily.  10.     Nitroglycerin 0.4 mg 1 tablet every 5 minutes as needed.     ALLERGIES:  1.     Adhesive tape.  2.     Amoxicillin.  3.     Lisinopril.  4.     Lipitor.     SOCIAL HISTORY:  The patient was a prior tobacco user.  She quit in  , did have a 35-pack-year history.  Rare alcohol.  No illicit drug  use.     FAMILY HISTORY:  Mother is living at age 92.  She has cardiac disease.  Her father  at age 90.  He had also cardiac disease.     REVIEW OF SYSTEMS:  Twelve-point review of systems is performed and is negative except for  pertinent positives in the HPI.     PHYSICAL EXAMINATION:  Vital Signs:  Temperature 97.3, pulse 68, respiratory rate 18, BP  106/56.  General:  Alert, oriented, well-appearing woman, in no acute distress.  HEENT:  Head is normocephalic, atraumatic.  Extraocular movements are  intact.  There is no scleral icterus or conjunctival pallor.  Mucous  membranes are 
moist.  Neck:  Supple without JVD or cervical lymphadenopathy.  Cardiovascular:  Regular rate, regular rhythm without murmur.  Pulmonary:  Clear without wheeze.  Gastrointestinal:  Soft.  Positive bowel sounds.  Mildly obese.  Nontender.  Extremities:  Warm and well-perfused without cyanosis, clubbing, or  edema.  There are mild varicosities noted.  Skin:  Dry and intact.     DIAGNOSTIC EXAMINATIONS:  Sodium 141, potassium 3.8, chloride 102,  carbon dioxide 28, BUN 15, and creatinine 0.7.  LFTs within normal  limits.  Troponin less than 0.012 x3.  Hemoglobin A1c is 8.1.  Chest x-  ray showed no acute abnormality.  EKG was independently reviewed, did  show sinus rhythm with a rate of 62 without acute ST-segment elevation  or depressions and normal-appearing EKG.     ASSESSMENT AND PLAN:  A 63-year-old female presenting with chest  pressure.  1.     Chest pressure.  Differential diagnosis includes angina,      initially acute coronary syndrome, gastroesophageal reflux disease,      arrhythmia.  The patient does describe these episodes with her      heart \"racing.\"  This was the possibility of arrhythmia, somewhat      atypical as it does not occur with any exertional symptoms;      however, she does have both multiple risk factors and known      coronary artery disease including prior stenting in 2010. Dr. Low      was consulted.  I discussed the case with Dr. Low and the patient.      He did offer cardiac catheterization; however, at this point, the      patient did defer cardiac catheterization and wishes rather to      pursue a treadmill stress test.  We will proceed with a nuclear      treadmill stress test today.  I will also obtain an echocardiogram      for further evaluation.  If cardiac workup is negative, I would      consider an event monitor at the time of discharge.  We will      discuss with Dr. Low.  2.     Coronary artery disease with prior stenting.  Continue on      aspirin, statin, beta-blocker, 
ARB.  3.     Diabetes mellitus.  Hold metformin.  Insulin sliding scale, Accu-      Cheks.  4.     Hypertension.  Continue on ARB, carvedilol.  5.     Gastroesophageal reflux disease.  Continue omeprazole.  6.     Hyperlipidemia.  Continue statin.  7.     Deep venous thrombosis prophylaxis.  No enoxaparin.     DISPOSITION:  The patient will be admitted to observation status.  Await  stress testing.     ADDENDUM:  After further discussion patient decided to proceed with left heart cardiac  catherization.          ______________________________       COLBY BUTCHER D.O.        AM:Johnna  D:  10/02/2018 08:48:17  T:  10/02/2018 09:34:05  Document #: [id number]     cc:     KAYLEY PRESSLEY M.D.          [facility] MEDICAL RECORDS     Electronic Signatures:  COLBY BUTCHER () (Signed on 02-Oct-18 13:49)  Authored  MEDQ (Other) (Entered on 02-Oct-18 09:34)  Entered     Last Updated: 02-Oct-18 13:49 by COLBY BUTCHER)    [AM1.1M]    Revision History        User Key Date/Time User Provider Type Action    > AM1.1 10/02/18 01:51 PM Colby Butcher DO Physician Sign    M - Manual            
done

## 2022-08-25 NOTE — ECT TREATMENT NOTE - NSECTCHANGEFREQ_PSY_ALL_CORE
No change
Increase
No change
Decrease
No change
Decrease
No change

## 2022-08-25 NOTE — ECT TREATMENT NOTE - NSECTPTEVAL_PSY_ALL_CORE
Patient evaluated and History and Physical reviewed prior to ECT. There are no significant changes to the patient's condition unless specified.

## 2022-08-25 NOTE — ECT PRE-PROCEDURE CHECKLIST - HAND OFF
Unit RN to OR RN
Holding RN to OR RN
Unit RN to OR RN
Holding RN to OR RN
Unit RN to OR RN
Unit RN to OR RN
Holding RN to OR RN
Unit RN to OR RN
Unit RN to OR RN
Holding RN to OR RN
Unit RN to OR RN
Unit RN to OR RN
Holding RN to OR RN
Unit RN to OR RN
Unit RN to OR RN

## 2022-08-25 NOTE — ECT TREATMENT NOTE - NSECTCPTCODE_PSY_ALL_CORE
90031 (ECT-Electroconvulsive Therapy)
99033 (ECT-Electroconvulsive Therapy)
26416 (ECT-Electroconvulsive Therapy)
23204 (ECT-Electroconvulsive Therapy)
13320 (ECT-Electroconvulsive Therapy)
29651 (ECT-Electroconvulsive Therapy)
76743 (ECT-Electroconvulsive Therapy)
86554 (ECT-Electroconvulsive Therapy)
67661 (ECT-Electroconvulsive Therapy)
57126 (ECT-Electroconvulsive Therapy)
07756 (ECT-Electroconvulsive Therapy)
02655 (ECT-Electroconvulsive Therapy)
35622 (ECT-Electroconvulsive Therapy)
46675 (ECT-Electroconvulsive Therapy)
86579 (ECT-Electroconvulsive Therapy)
75444 (ECT-Electroconvulsive Therapy)
80457 (ECT-Electroconvulsive Therapy)
57033 (ECT-Electroconvulsive Therapy)
07380 (ECT-Electroconvulsive Therapy)
80813 (ECT-Electroconvulsive Therapy)
07469 (ECT-Electroconvulsive Therapy)

## 2022-08-25 NOTE — ECT PRE-PROCEDURE CHECKLIST - PRO INTERPRETER NEED 2
English

## 2022-08-25 NOTE — ECT TREATMENT NOTE - NSECTFOCPECOMPLET_PSY_ALL_CORE
Focused Physical Exam Completed

## 2022-08-25 NOTE — ECT TREATMENT NOTE - NSECTPOSTTXMEDS_PSY_ALL_CORE
2 mg IV Push

## 2022-08-25 NOTE — ECT AMBULATORY DISCHARGE PLAN - PATIENT PORTAL LINK FT
You can access the FollowMyHealth Patient Portal offered by [facility] by registering at the following website: [email or web address]. By joining [employer]’s FollowMyHealth portal, you will also be able to view your health information using other applications (apps) compatible with our system.
You can access the FollowMyHealth Patient Portal offered by [facility] by registering at the following website: [email or web address]. By joining [employer]’s FollowMyHealth portal, you will also be able to view your health information using other applications (apps) compatible with our system.
You can access the FollowMyHealth Patient Portal offered by [facility] by registering at the following website: [email or web address]. By joining [employer]’s FollowMyHealth portal, you will also be able to view your health information using other applications (apps) compatible with our system.
You can access the FollowMyHealth Patient Portal offered by [facility] by registering at the following website: [email or web address]. By joining [employer]’s FollowMyHealth portal, you will also be able to view your health information using other applications (apps) compatible with our system.
You can access the FollowMyHealth Patient Portal offered by [facility] by registering at the following website: [email or web address]. By joining [employer]’s FollowMyHealth portal, you will also be able to view your health information using other applications (apps) compatible with our system.
You can access the FollowMyHealth Patient Portal offered by [facility] by registering at the following website: [email or web address]. By joining [employer]’s FollowMyHealth portal, you will also be able to view your health information using other applications (apps) compatible with our system.
You can access the FollowMyHealth Patient Portal offered by [facility] by registering at the following website: [email or web address]. By joining [employer]’s FollowMyHealth portal, you will also be able to view your health information using other applications (apps) compatible with our system.
You can access the FollowMyHealth Patient Portal offered by [facility] by registering at the following website: [email or web address]. By joining [employer]’s FollowMyHealth portal, you will also be able to view your health information using other applications (apps) compatible with our system.
You can access the FollowMyHealth Patient Portal offered by [facility] by registering at the following website: [email or web address]. By joining [employer]’s FollowMyHealth portal, you will also be able to view your health information using other applications (apps) compatible with our system.
You can access the FollowMyHealth Patient Portal offered by [facility] by registering at the following website: [email or web address]. By joining [employer]’s FollowMyHealth portal, you will also be able to view your health information using other applications (apps) compatible with our system.
You can access the FollowMyHealth Patient Portal offered by [facility] by registering at the following website: [email or web address]. By joining [employer]’s FollowMyHealth portal, you will also be able to view your health information using other applications (apps) compatible with our system.
You can access the FollowMyHealth Patient Portal offered by [facility] by registering at the following website: [email or web address]. By joining [employer]’s FollowMyHealth portal, you will also be able to view your health information using other applications (apps) compatible with our system.
You can access the FollowMyHealth Patient Portal offered by [facility] by registering at the following website: [email or web address]. By joining [employer]’s FollowMyHealth portal, you will also be able to view your health information using other applications (apps) compatible with our system.
You can access the FollowMyHealth Patient Portal offered by [facility] by registering at the following website: [email or web address]. By joining [employer]’s FollowMyHealth portal, you will also be able to view your health information using other applications (apps) compatible with our system.
You can access the FollowMyHealth Patient Portal offered by [facility] by registering at the following website: [email or web address]. By joining [employer]’s FollowMyHealth portal, you will also be able to view your health information using other applications (apps) compatible with our system.
You can access the FollowMyHealth Patient Portal offered by [facility] by registering at the following website: [email or web address]. By joining [employer]’s FollowMyHealth portal, you will also be able to view your health information using other applications (apps) compatible with our system.
You can access the FollowMyHealth Patient Portal offered by [facility] by registering at the following website: [email or web address]. By joining [employer]’s FollowMyHealth portal, you will also be able to view your health information using other applications (apps) compatible with our system.
You can access the FollowMyHealth Patient Portal offered by [facility] by registering at the following website: [email or web address]. By joining [employer]’s FollowMyHealth portal, you will also be able to view your health information using other applications (apps) compatible with our system.
You can access the FollowMyHealth Patient Portal offered by [facility] by registering at the following website: [email or web address]. By joining [employer]’s FollowMyHealth portal, you will also be able to view your health information using other applications (apps) compatible with our system.
You can access the FollowMyHealth Patient Portal offered by [facility] by registering at the following website: [email or web address]. By joining [employer]’s FollowMyHealth portal, you will also be able to view your health information using other applications (apps) compatible with our system.
You can access the FollowMyHealth Patient Portal offered by [facility] by registering at the following website: [email or web address]. By joining [employer]’s FollowMyHealth portal, you will also be able to view your health information using other applications (apps) compatible with our system.
You can access the FollowMyHealth Patient Portal offered by [facility] by registering at the following website: [email or web address]. By joining [employer]’s FollowMyHealth portal, you will also be able to view your health information using other applications (apps) compatible with our system.

## 2022-08-25 NOTE — ECT AMBULATORY DISCHARGE PLAN - NSPOSTECTPROVEDUCFT_PSY_ALL_CORE
COVID TEACHING, POST ECT INSTRUCTIONS  
Covid  instruction 
post ECT discharge instructions, covid educational material 
Covid 19 Precautions
COVID EDUCATION, POST ECT INSTRUCTIONS
covid
COVID EDUCATION, POST ECT INSTRUCTIONS
Covid  instruction 
COVID EDUCATION, POST ECT INSTRUCTIONS
COVID education sheet
Covid  and post treatment teachings
Covid instructions
Covid teaching and D/C instructions 
COVID EDUCATION, POST ECT INSTRUCTIONS
Covid 19 precautions reinforced
covid teaching  ect reinforced
COVID Education, POST ECT INSTRUCTIONS
Covid/ECT teachings
covid teaching  ect reinforced
post ECT discharge instructions, covid educational material

## 2022-08-25 NOTE — ECT TREATMENT NOTE - NSSUICPROTFACT_PSY_ALL_CORE
Responsibility to children, family, or others/Identifies reasons for living/Beloved pets

## 2022-08-25 NOTE — ECT AMBULATORY DISCHARGE PLAN - NSPROCPERF_PSY_ALL_CORE
Electroconvulsive Therapy (ECT)

## 2022-08-25 NOTE — ECT PRE-PROCEDURE CHECKLIST - NSPTSENTVIA_PSY_ALL_CORE
ambulate
stretcher
ambulate
stretcher
ambulate

## 2022-08-25 NOTE — ECT TREATMENT NOTE - NS_RISKASSESSMENTINTER_PSY_ALL_CORE
Low Acute Suicide Risk
Unable to determine Suicide Risk
Low Acute Suicide Risk
Unable to determine Suicide Risk
Low Acute Suicide Risk

## 2022-08-25 NOTE — ECT AMBULATORY DISCHARGE PLAN - NSPOSTECTCASEEMER_PSY_ALL_CORE
In case of any emergency, please go to the nearest emergency room

## 2022-08-25 NOTE — ECT TREATMENT NOTE - NSECTTXELECPLACE_PSY_ALL_CORE
Bifrontal

## 2022-08-25 NOTE — ECT PRE-PROCEDURE CHECKLIST - NSPROPTRIGHTNOTIFY_GEN_A_NUR
declines

## 2022-08-25 NOTE — ECT PRE-PROCEDURE CHECKLIST - NS PREOP CHK TEST_COVID RESULT_GEN_ALL_CORE
Negative

## 2022-08-25 NOTE — ECT TREATMENT NOTE - NSICDXBHPRIMARYDX_PSY_ALL_CORE
Major depressive disorder, recurrent episode, severe   F33.2  

## 2022-08-25 NOTE — ECT AMBULATORY DISCHARGE PLAN - NSPOSTECTFUPHCALL_PSY_ALL_CORE
You will receive a follow-up phone call from a nurse by the next business day after your treatment to ask how you are feeling.

## 2022-08-25 NOTE — ECT AMBULATORY DISCHARGE PLAN - NSPOSTECTWORSEPSYCHSX_PSY_ALL_CORE
If you are experiencing heightened or worsening psychological symptoms please contact your private psychiatrist.

## 2022-08-25 NOTE — ECT PRE-PROCEDURE CHECKLIST - NSPTSENTTO_PSY_ALL_CORE
procedural room

## 2022-08-25 NOTE — ECT PRE-PROCEDURE CHECKLIST - CAREGIVER ADDRESS
[address] [facility] [address]
[address] [facility] [address]
[address] [facility] [address]
[address] [facility] [address]
[address] [facility] [address]
[address] [facility] [address]
[address] [facility] [address]
[address] [facility] [address]
[address] [facility] [address]
[address] [facility] [address]
[address] [facility] [address]
[address] [facility] [address]
[address] [facility] [address]
[address] [facility] [address]
[address] [facility] [address]
[address] [facility] [address]
[address] [facility] [address]
[address] [facility] [address]
[address] [facility] [address]
[address] [facility] [address]
[address] [facility] [address]

## 2022-08-25 NOTE — ECT PRE-PROCEDURE CHECKLIST - TEMPERATURE IN FAHRENHEIT (DEGREES F)
98.2
97.7
98.5
98.6
98.4
98.1
97.7
98
98.1
98
98.4
98.1
97.8
97.6
98.3
98.5
98.2
98.2
97.1
98.3
98.5

## 2022-08-25 NOTE — ECT TREATMENT NOTE - NSECTCOMMENTS_PSY_ALL_CORE
Ms. Martinez reports that she was doing very well until the beginning of the week, when without any precipitating cause she started experiencing middle insomnia where she wakes up in the middle of night and unable to fall asleep. She has been sleep deprived and that has been making her anxious and stressed. She has not been able to go to Gym and has been experiencing crying spells. She denies any death wish/suicidal ideation/intent/plan. She reports that overall, her depression has been around 4/10 with 10 being the worst depression, but has been very anxious with sleep deprivation.     Recommended that she reaches out to her psychiatrist for medication adjustment. If after today's treatment and medication adjustment, she continues to experience symptoms of depression or they worsen, she should reach out to ECT team to come for an earlier treatment.   Ms. Martinez reports that she was doing very well until the beginning of the week, when without any precipitating cause she started experiencing middle insomnia where she wakes up in the middle of night and unable to fall asleep. She has been sleep deprived and that has been making her anxious and stressed. She has not been able to go to Gym and has been experiencing crying spells. She denies any death wish/suicidal ideation/intent/plan. She reports that overall, her depression has been around 4/10 with 10 being the worst depression, but has been very anxious with sleep deprivation.      reports that she has been waking up and then unable to fall asleep for last few days. She then watches TV and is unable to fall asleep and cries. He reports that even though patient is not cognizant of it, one major stressor is that her sons are leaving for school at the end of this week and this has usually been a stressor for her. He also feels that her watching [employer] crime related programs might also have acted as a contributor.     Recommended that patient reaches out to her psychiatrist for medication adjustment to address the insomnia. If after today's treatment and medication adjustment, she continues to experience regression, she should reach out to ECT team to come for an earlier treatment.

## 2022-08-25 NOTE — ECT PRE-PROCEDURE CHECKLIST - NSMEDSDOSETAKEN_PSY_ALL_CORE
Wellbutrin 300 mg, Hydrochorothyazide  12.5 mg, buspar 5 mg @0800
Wellbutrin 300 mg, Hydrochlorothiazide  12.5 mg
Wellbutrin 300 mg, Hydrochlorothiazide  12.5 mg
Wellbutrin 300 mg @7am
Wellbutrin 300 mg @7am/Hydrochorothyazide  12.5 mg 
Wellbutrin 300 mg, Hydrochorothyazide  12.5 mg, buspar 5 mg @0800
Wellbutrin 300 mg @7am
Wellbutrin 300 mg, Hydrochlorothiazide  12.5 mg
Wellbutrin 300 mg @7am
Wellbutrin 300 mg, Hydrochlorothiazide  12.5 mg
Wellbutrin 300 mg, Hydrochorothyazide  12.5 mg, buspar 5 mg @0800
Wellbutrin 300 mg, Hydrochlorothiazide  12.5 mg
Wellbutrin 300 mg @7am/Hydrochorothyazide  12.5 mg 
Wellbutrin 300 mg @7am/Hydrochorothyazide  12.5 mg 
Wellbutrin 300 mg, Hydrochorothyazide  12.5 mg, buspar 5 mg @0800
Wellbutrin 300 mg, Hydrochorothyazide  12.5 mg, buspar 5 mg @0800
Wellbutrin 300 mg @7am/Hydrochorothyazide  12.5 mg 
Wellbutrin 300 mg @7am

## 2022-08-25 NOTE — ECT PRE-PROCEDURE CHECKLIST - NSPROEDAABILITYLEARN_GEN_A_NUR
anxiety

## 2022-08-25 NOTE — ECT PRE-PROCEDURE CHECKLIST - NSECTNPODT_PSY_ALL_CORE
16-May-2022 05:00
02-May-2022 20:00
10-Jul-2022 20:00
09-May-2022 20:00
28-Apr-2022 12:00
13-Jul-2022 19:00
21-Jun-2022 12:00
25-Aug-2022
13-Jun-2022 19:00
23-May-2022 00:00
27-Jun-2022 20:00
28-Jul-2022 19:00
30-May-2022 20:00
03-Aug-2022 19:00
07-Jun-2022 19:00
04-May-2022 20:00
11-Aug-2022 20:00
30-Jun-2022 12:00
04-Jul-2022 20:00
22-Jul-2022 05:00

## 2022-08-25 NOTE — ECT PRE-PROCEDURE CHECKLIST - NSPTPREGNANT_PSY_ALL_CORE
denies pregnancy, pregnancy test waiver signed
signed 06/28/22/denies pregnancy, pregnancy test waiver signed
denies pregnancy, pregnancy test waiver signed
signed 06/28/22/denies pregnancy, pregnancy test waiver signed
denies pregnancy, pregnancy test waiver signed
signed 07/05/22/denies pregnancy, pregnancy test waiver signed
denies pregnancy, pregnancy test waiver signed
[id number]//2/denies pregnancy, pregnancy test waiver signed
denies pregnancy, pregnancy test waiver signed
signed 07/08/22/denies pregnancy, pregnancy test waiver signed
signed 07/08/22/denies pregnancy, pregnancy test waiver signed
denies pregnancy, pregnancy test waiver signed
denies pregnancy, pregnancy test waiver signed
signed 07/08/22/denies pregnancy, pregnancy test waiver signed
denies pregnancy, pregnancy test waiver signed

## 2022-08-25 NOTE — ECT PRE-PROCEDURE CHECKLIST - NSPROPTRIGHTBILLOFRIGHTS_GEN_A_NUR
patient

## 2022-08-25 NOTE — ECT PRE-PROCEDURE CHECKLIST - ALLERGY BAND ON
no known allergies

## 2022-08-25 NOTE — ECT PRE-PROCEDURE CHECKLIST - AS BP NONINV METHOD
electronic

## 2022-08-25 NOTE — ECT PRE-PROCEDURE CHECKLIST - TEMPERATURE IN CELSIUS (DEGREES C)
36.5
36.8
36.4
36.7
36.8
36.7
36.9
37
36.6
36.7
36.8
36.9
36.7
36.5
36.8
36.7
36.2
36.8
36.9

## 2022-08-25 NOTE — ECT PRE-PROCEDURE CHECKLIST - LAST TOOK
solids
clears
meds with sips of water/clears
clears
meds with sips of water/clears
solids
clears
meds with sips of water/clears
solids
solids
meds with sips of water/clears
solids
clears
solids
clears
solids

## 2022-08-25 NOTE — ECT PRE-PROCEDURE CHECKLIST - PATIENT'S SEXUAL ORIENTATION
Heterosexual

## 2022-08-25 NOTE — ECT AMBULATORY DISCHARGE PLAN - PRO INTERPRETER NEED 2
English

## 2022-08-25 NOTE — ECT TREATMENT NOTE - NSECTMACHPARA1ST_PSY_ALL_CORE
Thymatron

## 2022-08-25 NOTE — ECT PRE-PROCEDURE CHECKLIST - AS BP NONINV SITE
left upper arm
right upper arm
left upper arm
right upper arm
left upper arm
right upper arm
right upper arm
left upper arm
left upper arm
right upper arm
right upper arm
left upper arm
right upper arm
left upper arm
right upper arm
left upper arm

## 2022-08-25 NOTE — ECT AMBULATORY DISCHARGE PLAN - NSDCPNDISPN_GEN_ALL_CORE
Education provided on the pain management plan of care

## 2022-08-25 NOTE — ECT AMBULATORY DISCHARGE PLAN - NSPOSTECTSMOKING_PSY_ALL_CORE
If you are a smoker, it is important for your health to stop smoking.  Please be aware that second hand smoke is also harmful.

## 2022-08-25 NOTE — ECT AMBULATORY DISCHARGE PLAN - NSDPACMPNY_GEN_ALL_CORE
Family
Spouse
Family
Spouse
Family
Spouse

## 2022-08-25 NOTE — ECT AMBULATORY DISCHARGE PLAN - NSECTPROCEDUREDATE_PSY_ALL_CORE
21-Jun-2022
28-Apr-2022
10-May-2022
03-May-2022
08-Jul-2022
22-Jul-2022
04-Aug-2022
14-Jul-2022
30-Jun-2022
16-May-2022
11-Jul-2022
14-Jun-2022
23-May-2022
28-Jun-2022
29-Jul-2022
12-Aug-2022
14-Jun-2022
31-May-2022
05-May-2022
25-Aug-2022
05-Jul-2022
08-Jun-2022

## 2022-08-25 NOTE — ECT TREATMENT NOTE - NSECTREVSYS_PSY_ALL_CORE
Cardiovascular/Other

## 2022-08-25 NOTE — ECT TREATMENT NOTE - NSECTTXPERFDATETIME_PSY_ALL_CORE
28-Jun-2022 12:17
08-Jul-2022 14:26
14-Jul-2022 14:20
29-Jul-2022 15:17
14-Jun-2022 11:13
11-Jul-2022 15:23
28-Apr-2022 15:43
31-May-2022 13:58
05-Jul-2022 15:09
16-May-2022 14:17
25-Aug-2022 11:52
12-Aug-2022 09:49
08-Jun-2022 10:15
23-May-2022 08:40
30-Jun-2022 15:54
10-May-2022 16:37
04-Aug-2022 11:48
21-Jun-2022 15:06
03-May-2022 14:59
05-May-2022 15:39
22-Jul-2022 16:36

## 2022-08-25 NOTE — ECT AMBULATORY DISCHARGE PLAN - NSPOSTECTCALLBEFORE_PSY_ALL_CORE
[facility] ([facility]) scheduling office at [phone]
[facility] ([facility]) scheduling office at [phone]
[facility] ([facility]) scheduling office at [phone]
[facility] ([facility]) scheduling office at [phone]
[facility] ([facility]) scheduling office at [phone]
[facility] ([facility]) scheduling office at [phone]
[facility] ([facility]) scheduling office at [phone]
[facility] ([facility]) scheduling office at [phone]
[facility] ([facility]) scheduling office at [phone]
[facility] ([facility]) scheduling office at [phone]
[facility] ([facility]) scheduling office at [phone]
[facility] ([facility]) scheduling office at [phone]
[facility] ([facility]) scheduling office at [phone]
[facility] ([facility]) scheduling office at [phone]
[facility] ([facility]) scheduling office at [phone]
[facility] ([facility]) scheduling office at [phone]
[facility] ([facility]) scheduling office at [phone]
[facility] ([facility]) scheduling office at [phone]
[facility] ([facility]) scheduling office at [phone]
[facility] ([facility]) scheduling office at [phone]
[facility] ([facility]) scheduling office at [phone]

## 2022-08-25 NOTE — ECT PRE-PROCEDURE CHECKLIST - NSADULTACCOMPRELATION_PSY_ALL_CORE
spouse
parent(s)
spouse
other (specify)
parent(s)
spouse
other (specify)
spouse

## 2022-08-25 NOTE — ECT PRE-PROCEDURE CHECKLIST - ALLERGIES
Allergies:-  No Known Allergies      

## 2022-08-25 NOTE — ECT PRE-PROCEDURE CHECKLIST - NSDISPOFBELONG_PSY_ALL_CORE
not applicable
given to procedural RN
not applicable
given to procedural RN
not applicable

## 2022-08-25 NOTE — ECT PRE-PROCEDURE CHECKLIST - CAREGIVER RELATION TO PATIENT
spouse

## 2022-08-25 NOTE — ECT TREATMENT NOTE - NSECTPOSTTXSUMMARY_PSY_ALL_CORE
The patient had a well modified grand mal seizure under general anesthesia and muscle relaxant. The patient is alert, responsive, in no acute distress.  Recovery uneventful. 
The patient had a well modified grand mal seizure under general anesthesia and a muscle relaxant.  The patient is alert, responsive, in no acute distress.  Recovery uneventful. 
The patient had a well modified grand mal seizure under general anesthesia and muscle relaxant. The patient is alert, responsive, in no acute distress.  Recovery uneventful.
The patient had a well modified grand mal seizure under general anesthesia and muscle relaxant. The patient is alert, responsive, in no acute distress.  Recovery uneventful.
The patient had a well modified grand mal seizure under general anesthesia and muscle relaxant.  The patient is alert, responsive, and in no acute distress. Recovery uneventful.
The patient had a well modified grand mal seizure under general anesthesia and muscle relaxant. The patient is alert, responsive, in no acute distress.  Recovery uneventful.
The patient had a well modified grand mal seizure under general anesthesia and a muscle relaxant.  The patient is alert, responsive, in no acute distress.  Recovery uneventful. 
The patient had a well modified grand mal seizure under general anesthesia and muscle relaxant. The patient is alert, responsive, in no acute distress.  Recovery uneventful.
The patient had a well modified grand mal seizure under general anesthesia and a muscle relaxant.  The patient is alert, responsive, in no acute distress.  Recovery uneventful. 
The patient had a well modified grand mal seizure under general anesthesia and a muscle relaxant.  The patient is alert, responsive, in no acute distress.  Recovery uneventful. 
The patient had a well modified grand mal seizure under general anesthesia and muscle relaxant.  The patient is alert, responsive, and in no acute distress. Recovery uneventful.

## 2022-08-25 NOTE — ECT PRE-PROCEDURE CHECKLIST - INV PERIPH IV DEVICE
Angiocath

## 2022-08-25 NOTE — ECT PRE-PROCEDURE CHECKLIST - HOW PATIENT ADDRESSED, PROFILE
Sailaja

## 2022-08-25 NOTE — ECT PRE-PROCEDURE CHECKLIST - NS PREOP CHK TEST_COVID_DT_GEN_ALL_CORE
20-Jul-2022
11-Aug-2022
28-May-2022
23-Aug-2022
28-Apr-2022
08-Jul-2022
28-Jun-2022
03-May-2022
30-Jun-2022
05-Jul-2022
05-May-2022
13-May-2022
11-Jul-2022
02-Aug-2022
27-Jul-2022
05-Jun-2022
20-May-2022
26-Jun-2022
18-Jun-2022
27-Apr-2022
05-Jun-2022

## 2022-08-25 NOTE — ECT PRE-PROCEDURE CHECKLIST - CAREGIVER NAME
Los Martinez	

## 2022-08-25 NOTE — ECT PRE-PROCEDURE CHECKLIST - PATIENT'S PREFERRED PRONOUN
Her/She

## 2022-08-25 NOTE — ECT PRE-PROCEDURE CHECKLIST - INV PERIPH IV LOCATION
Right:/median cubital vein
Right:/basilic vein
Right:/metacarpal vein
Right:/median cubital vein
Right:
Right:/median cubital vein
Right:/basilic vein
Right:/basilic vein
Right:/median cubital vein
Right:/basilic vein
Right:/basilic vein
Left:
Right:/cephalic vein
Right:

## 2022-08-25 NOTE — ECT PRE-PROCEDURE CHECKLIST - NSBHSUPPORTCOMMENT_PSY_ALL_CORE
[place]

## 2022-08-25 NOTE — ECT AMBULATORY DISCHARGE PLAN - NSPOSTECTCONTPROV_PSY_ALL_CORE
[facility] ([facility]) ECT Suite at [phone]
[facility] ([facility]) ECT Suite at [phone]
[facility] ([facility]) ECT Suite at [phone]
[facility] ([facility]) ECT Suite at [phone]
[facility] ([facility]) ECT Suite at [phone]
[facility] ([facility]) ECT Suite at [phone]
[facility] ([facility]) ECT Suite at [phone]
[facility] ([facility]) ECT Suite at [phone]
[facility] ([facility]) ECT Suite at [phone]
[facility] ([facility]) ECT Suite at [phone]
[facility] ([facility]) ECT Suite at [phone]
[facility] ([facility]) ECT Suite at [phone]
[facility] ([facility]) ECT Suite at [phone]
[facility] ([facility]) ECT Suite at [phone]
[facility] ([facility]) ECT Suite at [phone]
[facility] ([facility]) ECT Suite at [phone]
[facility] ([facility]) ECT Suite at [phone]
[facility] ([facility]) ECT Suite at [phone]
[facility] ([facility]) ECT Suite at [phone]
[facility] ([facility]) ECT Suite at [phone]

## 2022-08-25 NOTE — ECT PRE-PROCEDURE CHECKLIST - NSBHSUPPORTNAME_PSY_ALL_CORE
Request for order
[place]

## 2022-08-25 NOTE — ECT AMBULATORY DISCHARGE PLAN - NSPOSTECTCALLZHH_PSY_ALL_CORE
Call the [facility] ECT suite at [phone]
Call the [facility] ECT suite at [phone]
Call the [facility] ECT suite at [phone]
Call the [facility] ECT suite at [phone]
Call the [facility] ECT suite at [phone]
Call the [facility] ECT suite at [phone]
Call the [facility] ECT suite at [phone]
Call the [facility] ECT suite at [phone]
Call the [facility] ECT suite at [phone]
Call the [facility] ECT suite at [phone]
Call the [facility] ECT suite at [phone]
Call the [facility] ECT suite at [phone]
Call the [facility] ECT suite at [phone]
Call the [facility] ECT suite at [phone]
Call the [facility] ECT suite at [phone]
Call the [facility] ECT suite at [phone]
Call the [facility] ECT suite at [phone]
Call the [facility] ECT suite at [phone]
Call the [facility] ECT suite at [phone]
Call the [facility] ECT suite at [phone]
Call the [facility] ECT suite at [phone]

## 2022-08-25 NOTE — ECT AMBULATORY DISCHARGE PLAN - NS TRANSFER DISPOSITION PATIENT BELONGINGS
not applicable
with patient
with patient
not applicable
given to family
not applicable
with patient/given to family
not applicable

## 2022-08-25 NOTE — ECT PRE-PROCEDURE CHECKLIST - NSMENTALSTATUS_PSY_ALL_CORE
anxious
calm
anxious
calm
anxious
calm
anxious
calm
anxious
calm

## 2022-08-25 NOTE — ECT PRE-PROCEDURE CHECKLIST - ISOLATION PRECAUTIONS
none

## 2022-08-25 NOTE — ECT TREATMENT NOTE - NSECTROSNEGAT_PSY_ALL_CORE
Review of Systems negative/unchanged from previous exam except as noted below

## 2022-08-25 NOTE — ECT TREATMENT NOTE - NSECTRECTXSCHED_PSY_ALL_CORE
Rescue Treatment 2X per week
Weekly
Acute Course 2X per week
Acute Course 2X per week
Weekly
Acute Course 2X per week
Weekly
Every 2 weeks
Every 2 weeks
Weekly
Acute Course 2X per week
Weekly
Acute Course 2X per week
Weekly
Acute Course 2X per week
Rescue Treatment 2X per week
Weekly
Weekly
Other

## 2022-08-25 NOTE — ECT PRE-PROCEDURE CHECKLIST - PATIENT'S GENDER IDENTITY
Female

## 2022-08-25 NOTE — ECT AMBULATORY DISCHARGE PLAN - NSPOSTECTDIET_PSY_ALL_CORE
Gradually resume your regular diet/Increase fluids

## 2022-09-05 ENCOUNTER — NON-APPOINTMENT (OUTPATIENT)
Age: 47
End: 2022-09-05

## 2023-01-25 NOTE — ECT OUTPATIENT PROGRAM DISCHARGE SUMMARY - NSECTTREATMENTSUMMARY_PSY_ALL_CORE
After informed consent, patient was started on an acute course of Bifrontal ECT. She responded well to ECT and tolerated it well. The ECT frequency was further reduced, but she had recurrence of symptoms needing rescue ECT to which she again responded well. The ECT interval was further reduced and after 4 weekly ECT, she was advised to continue ECT at q 2 weeks interval. She, however decided to discontinue ECT and cancelled subsequent treatments. She was going to follow up with her psychiatrist. In total, she received 21 treatments. Her cognition score was 10/10 at the last treatment and she tolerated it well in terms of cognition. At last visit, she was having mild depressive symptoms attributable to psychosocial stressors.

## 2024-02-09 NOTE — ED PROVIDER NOTE - RESPIRATORY, MLM
[facility]  [facility] [address]  Scheduled Appointment: 03/05/2024     Breath sounds clear and equal bilaterally.

## 2025-03-11 NOTE — ED PROVIDER NOTE - NSCAREINITIATED _GEN_ER
Phone call from the patient - Dr Thibodeaux is not taking new patients until next year.  Is there another nephrologist you could recommend?    [phone]   Jaime Alamo(ANDREW)

## 2025-04-03 ENCOUNTER — NON-APPOINTMENT (OUTPATIENT)
Age: 50
End: 2025-04-03

## 2025-06-07 ENCOUNTER — EMERGENCY (EMERGENCY)
Facility: HOSPITAL | Age: 50
LOS: 1 days | End: 2025-06-07
Attending: STUDENT IN AN ORGANIZED HEALTH CARE EDUCATION/TRAINING PROGRAM
Payer: COMMERCIAL

## 2025-06-07 VITALS
SYSTOLIC BLOOD PRESSURE: 125 MMHG | TEMPERATURE: 98 F | HEART RATE: 59 BPM | RESPIRATION RATE: 20 BRPM | OXYGEN SATURATION: 100 % | DIASTOLIC BLOOD PRESSURE: 82 MMHG

## 2025-06-07 VITALS
HEART RATE: 80 BPM | DIASTOLIC BLOOD PRESSURE: 84 MMHG | WEIGHT: 139.99 LBS | SYSTOLIC BLOOD PRESSURE: 125 MMHG | OXYGEN SATURATION: 97 % | HEIGHT: 61 IN | RESPIRATION RATE: 20 BRPM | TEMPERATURE: 98 F

## 2025-06-07 LAB
ALBUMIN SERPL ELPH-MCNC: 3.9 G/DL — SIGNIFICANT CHANGE UP (ref 3.3–5)
ALP SERPL-CCNC: 82 U/L — SIGNIFICANT CHANGE UP (ref 40–120)
ALT FLD-CCNC: 22 U/L — SIGNIFICANT CHANGE UP (ref 10–45)
ANION GAP SERPL CALC-SCNC: 12 MMOL/L — SIGNIFICANT CHANGE UP (ref 5–17)
APPEARANCE UR: CLEAR — SIGNIFICANT CHANGE UP
AST SERPL-CCNC: 22 U/L — SIGNIFICANT CHANGE UP (ref 10–40)
BACTERIA # UR AUTO: ABNORMAL /HPF
BASOPHILS # BLD AUTO: 0.03 K/UL — SIGNIFICANT CHANGE UP (ref 0–0.2)
BASOPHILS NFR BLD AUTO: 0.6 % — SIGNIFICANT CHANGE UP (ref 0–2)
BILIRUB SERPL-MCNC: 0.3 MG/DL — SIGNIFICANT CHANGE UP (ref 0.2–1.2)
BILIRUB UR-MCNC: NEGATIVE — SIGNIFICANT CHANGE UP
BUN SERPL-MCNC: 9 MG/DL — SIGNIFICANT CHANGE UP (ref 7–23)
CALCIUM SERPL-MCNC: 8.9 MG/DL — SIGNIFICANT CHANGE UP (ref 8.4–10.5)
CAST: 0 /LPF — SIGNIFICANT CHANGE UP (ref 0–4)
CHLORIDE SERPL-SCNC: 107 MMOL/L — SIGNIFICANT CHANGE UP (ref 96–108)
CO2 SERPL-SCNC: 21 MMOL/L — LOW (ref 22–31)
COLOR SPEC: YELLOW — SIGNIFICANT CHANGE UP
CREAT SERPL-MCNC: 0.73 MG/DL — SIGNIFICANT CHANGE UP (ref 0.5–1.3)
DIFF PNL FLD: NEGATIVE — SIGNIFICANT CHANGE UP
EGFR: 100 ML/MIN/1.73M2 — SIGNIFICANT CHANGE UP
EGFR: 100 ML/MIN/1.73M2 — SIGNIFICANT CHANGE UP
EOSINOPHIL # BLD AUTO: 0.26 K/UL — SIGNIFICANT CHANGE UP (ref 0–0.5)
EOSINOPHIL NFR BLD AUTO: 4.9 % — SIGNIFICANT CHANGE UP (ref 0–6)
GAS PNL BLDV: SIGNIFICANT CHANGE UP
GLUCOSE SERPL-MCNC: 83 MG/DL — SIGNIFICANT CHANGE UP (ref 70–99)
GLUCOSE UR QL: NEGATIVE MG/DL — SIGNIFICANT CHANGE UP
HCT VFR BLD CALC: 42 % — SIGNIFICANT CHANGE UP (ref 34.5–45)
HGB BLD-MCNC: 14.2 G/DL — SIGNIFICANT CHANGE UP (ref 11.5–15.5)
IMM GRANULOCYTES NFR BLD AUTO: 0.2 % — SIGNIFICANT CHANGE UP (ref 0–0.9)
KETONES UR QL: ABNORMAL MG/DL
LEUKOCYTE ESTERASE UR-ACNC: ABNORMAL
LYMPHOCYTES # BLD AUTO: 2.01 K/UL — SIGNIFICANT CHANGE UP (ref 1–3.3)
LYMPHOCYTES # BLD AUTO: 37.9 % — SIGNIFICANT CHANGE UP (ref 13–44)
MCHC RBC-ENTMCNC: 32.1 PG — SIGNIFICANT CHANGE UP (ref 27–34)
MCHC RBC-ENTMCNC: 33.8 G/DL — SIGNIFICANT CHANGE UP (ref 32–36)
MCV RBC AUTO: 95 FL — SIGNIFICANT CHANGE UP (ref 80–100)
MONOCYTES # BLD AUTO: 0.35 K/UL — SIGNIFICANT CHANGE UP (ref 0–0.9)
MONOCYTES NFR BLD AUTO: 6.6 % — SIGNIFICANT CHANGE UP (ref 2–14)
NEUTROPHILS # BLD AUTO: 2.65 K/UL — SIGNIFICANT CHANGE UP (ref 1.8–7.4)
NEUTROPHILS NFR BLD AUTO: 49.8 % — SIGNIFICANT CHANGE UP (ref 43–77)
NITRITE UR-MCNC: NEGATIVE — SIGNIFICANT CHANGE UP
NRBC BLD AUTO-RTO: 0 /100 WBCS — SIGNIFICANT CHANGE UP (ref 0–0)
PH UR: 7.5 — SIGNIFICANT CHANGE UP (ref 5–8)
PLATELET # BLD AUTO: 232 K/UL — SIGNIFICANT CHANGE UP (ref 150–400)
POTASSIUM SERPL-MCNC: 4 MMOL/L — SIGNIFICANT CHANGE UP (ref 3.5–5.3)
POTASSIUM SERPL-SCNC: 4 MMOL/L — SIGNIFICANT CHANGE UP (ref 3.5–5.3)
PROT SERPL-MCNC: 6.2 G/DL — SIGNIFICANT CHANGE UP (ref 6–8.3)
PROT UR-MCNC: NEGATIVE MG/DL — SIGNIFICANT CHANGE UP
RBC # BLD: 4.42 M/UL — SIGNIFICANT CHANGE UP (ref 3.8–5.2)
RBC # FLD: 14 % — SIGNIFICANT CHANGE UP (ref 10.3–14.5)
RBC CASTS # UR COMP ASSIST: 5 /HPF — HIGH (ref 0–4)
REVIEW: SIGNIFICANT CHANGE UP
SODIUM SERPL-SCNC: 140 MMOL/L — SIGNIFICANT CHANGE UP (ref 135–145)
SP GR SPEC: 1.02 — SIGNIFICANT CHANGE UP (ref 1–1.03)
SQUAMOUS # UR AUTO: 8 /HPF — HIGH (ref 0–5)
UROBILINOGEN FLD QL: 1 MG/DL — SIGNIFICANT CHANGE UP (ref 0.2–1)
WBC # BLD: 5.31 K/UL — SIGNIFICANT CHANGE UP (ref 3.8–10.5)
WBC # FLD AUTO: 5.31 K/UL — SIGNIFICANT CHANGE UP (ref 3.8–10.5)
WBC UR QL: 4 /HPF — SIGNIFICANT CHANGE UP (ref 0–5)

## 2025-06-07 PROCEDURE — 85014 HEMATOCRIT: CPT

## 2025-06-07 PROCEDURE — 82435 ASSAY OF BLOOD CHLORIDE: CPT

## 2025-06-07 PROCEDURE — 83735 ASSAY OF MAGNESIUM: CPT

## 2025-06-07 PROCEDURE — 84295 ASSAY OF SERUM SODIUM: CPT

## 2025-06-07 PROCEDURE — 82803 BLOOD GASES ANY COMBINATION: CPT

## 2025-06-07 PROCEDURE — 87086 URINE CULTURE/COLONY COUNT: CPT

## 2025-06-07 PROCEDURE — 99284 EMERGENCY DEPT VISIT MOD MDM: CPT | Mod: 25

## 2025-06-07 PROCEDURE — 96375 TX/PRO/DX INJ NEW DRUG ADDON: CPT

## 2025-06-07 PROCEDURE — 85025 COMPLETE CBC W/AUTO DIFF WBC: CPT

## 2025-06-07 PROCEDURE — 80053 COMPREHEN METABOLIC PANEL: CPT

## 2025-06-07 PROCEDURE — 84132 ASSAY OF SERUM POTASSIUM: CPT

## 2025-06-07 PROCEDURE — 74177 CT ABD & PELVIS W/CONTRAST: CPT | Mod: 26

## 2025-06-07 PROCEDURE — 85018 HEMOGLOBIN: CPT

## 2025-06-07 PROCEDURE — 96374 THER/PROPH/DIAG INJ IV PUSH: CPT | Mod: XU

## 2025-06-07 PROCEDURE — 99285 EMERGENCY DEPT VISIT HI MDM: CPT

## 2025-06-07 PROCEDURE — 81001 URINALYSIS AUTO W/SCOPE: CPT

## 2025-06-07 PROCEDURE — 82330 ASSAY OF CALCIUM: CPT

## 2025-06-07 PROCEDURE — 82947 ASSAY GLUCOSE BLOOD QUANT: CPT

## 2025-06-07 PROCEDURE — 74177 CT ABD & PELVIS W/CONTRAST: CPT

## 2025-06-07 PROCEDURE — 83605 ASSAY OF LACTIC ACID: CPT

## 2025-06-07 RX ORDER — ACETAMINOPHEN 500 MG/5ML
1000 LIQUID (ML) ORAL ONCE
Refills: 0 | Status: COMPLETED | OUTPATIENT
Start: 2025-06-07 | End: 2025-06-07

## 2025-06-07 RX ORDER — KETOROLAC TROMETHAMINE 30 MG/ML
30 INJECTION, SOLUTION INTRAMUSCULAR; INTRAVENOUS ONCE
Refills: 0 | Status: DISCONTINUED | OUTPATIENT
Start: 2025-06-07 | End: 2025-06-07

## 2025-06-07 RX ADMIN — Medication 400 MILLIGRAM(S): at 16:03

## 2025-06-07 RX ADMIN — KETOROLAC TROMETHAMINE 30 MILLIGRAM(S): 30 INJECTION, SOLUTION INTRAMUSCULAR; INTRAVENOUS at 17:52

## 2025-06-07 NOTE — ED PROVIDER NOTE - GASTROINTESTINAL, MLM
Abdomen soft,  no guarding. Tenderness to palpation in LUQ, LLQ, suprapubic regions. Mild left CVA tenderness

## 2025-06-07 NOTE — ED PROVIDER NOTE - PROGRESS NOTE DETAILS
Ramraj PGY3: Labs are unremarkable.  CT scan showed no acute pelvic pathology, but showed multiple incidental findings including ovarian cyst and a myomatous uterus.  On reassessment, patient reports slight improvement in symptoms.  Will give patient additional pain medications (Toradol).  Patient declining transvaginal ultrasound in the ED and opting for following up with gynecologist outpatient for additional imaging.  Gynecology has previously told patient that it is perimenopausal pain.  She presented to the ED today because her pain worsened.  She denies any vaginal discharge/bleeding, difficulty urinating, or difficulty with bowel movements.

## 2025-06-07 NOTE — ED PROVIDER NOTE - CLINICAL SUMMARY MEDICAL DECISION MAKING FREE TEXT BOX
Prescription for mupirocin fax[facility]    A call was placed to Alan Flores. They request that instructions be faxed to their office at [phone]. A letter was composed with the below information and faxed to Alan Flores.   49-year-old female history of uterine fibroids, ovarian cyst presenting with acute worsening of left sided abdominal pain over the past day. Pain has been intermittent for 2 weeks. Recent pelvic exam and UA and OB was unremarkable. No n/v, changes in bowel habits, vaginal discharge or bleeding, burning with urination or hematuria noted. Abdominal pain with urination noted today. Recommended by UC for evaluation in the ED .Vital signs reviewed and stable. Physical exam remarkable for TTP in LUQ, LLQ, suprapubic, L CVA.  Differential includes nephro/ureterolithiasis, diverticulitis, cystitis,  UTI, intermittent ovarian torsion. Will obtain blood work, UA, CTAP, and pelvic exam. Will re-assess. 49-year-old female history of uterine fibroids, ovarian cyst presenting with acute worsening of left sided abdominal pain over the past day. Pain has been intermittent for 2 weeks. Recent pelvic exam and UA and OB was unremarkable. No n/v, changes in bowel habits, vaginal discharge or bleeding, burning with urination or hematuria noted. Abdominal pain with urination noted today. Recommended by UC for evaluation in the ED .Vital signs reviewed and stable. Physical exam remarkable for TTP in LUQ, LLQ, suprapubic, L CVA.  Differential includes nephro/ureterolithiasis, diverticulitis, cystitis,  UTI, intermittent ovarian torsion. Will obtain blood work, UA, CTAP, and pelvic exam. Will re-assess.    Attending Nello: See attending attestation.

## 2025-06-07 NOTE — ED PROVIDER NOTE - NSFOLLOWUPINSTRUCTIONS_ED_ALL_ED_FT
Reason for ED Visit:  - Worsening lower abdominal pain    Findings/Diagnosis:   - No evidence of kidney stones, bowel obstruction, or other acute abdominal/pelvic problems requiring additional treatment or urgent imaging.   - Myomatous uterus   - 3.1 x 2.5 x 2.7 cm right ovarian cyst and 1.3 cm left ovarian cyst   - Multiple hepatic hemangiomata    Please return to the ED immediately for any new, worsening, or concerning symptoms including, but not limited to:   - Persistent/worsening abdominal/pelvic pain   - Vaginal bleeding or discharge    Please take the following medications at home:   - Acetaminophen (Tylenol) 500 to 1000 mg every 6-8 hours as needed for pain   - Ibuprofen (Advil or Motrin) 400 to 600 mg every 6-8 hours as needed for pain, take with food   - Alternate acetaminophen and ibuprofen every 3 hours for optimal pain coverage    Please follow up with your gynecologist to review the incidental findings listed above and obtain nonemergent pelvic ultrasound and/or MRI.    Thank you for choosing us for your care.

## 2025-06-07 NOTE — ED PROVIDER NOTE - NSFOLLOWUPCLINICS_GEN_ALL_ED_FT
[facility]  OB/GYN & Surg  [address] [place] [address]  Phone: [phone]  Fax:

## 2025-06-07 NOTE — ED ADULT NURSE NOTE - OBJECTIVE STATEMENT
50 y/o female with PMH uterine fibroids, left ovarian cyst presenting to ED for Left lower abd pain.  Patient has been experiencing intermittent lower abdominal and lower back cramping for 2 weeks. Went to ob-gyn 1 week ago where a pelvic exam and UA was unremarkable. Patient was walking in the store yesterday when the left-sided abdominal pain acutely worsened. The pain has been worsening since yesterday and is worse with exertion and movement. Endorses lightheadedness and abdominal pain radiating to back with urination today. Upon exam pt aox4 breathing even unlabored spontaneously, abd soft, TTP LUQ LLQ,  Denies nausea, vomiting, diarrhea, constipation, blood in bowel movements, burning with urination, hematuria, vaginal bleeding or discharge, fever, chest pain, or shortness of breath. LMP was in Jan 2025.

## 2025-06-07 NOTE — ED PROVIDER NOTE - PATIENT PORTAL LINK FT
You can access the FollowMyHealth Patient Portal offered by [facility] by registering at the following website: [email or web address]. By joining [employer]’s FollowMyHealth portal, you will also be able to view your health information using other applications (apps) compatible with our system.

## 2025-06-07 NOTE — ED PROVIDER NOTE - OBJECTIVE STATEMENT
Patient is a 49-year-old female history of uterine fibroids, L ovarian cyst presenting with left lower abdominal pain. Patient has been experiencing intermittent lower abdominal and lower back cramping for 2 weeks. Went to ob-gyn 1 week ago where a pelvic exam and UA was unremarkable. Patient was walking in the store yesterday when the left-sided abdominal pain acutely worsened. The pain has been worsening since yesterday and is worse with exertion and movement. Endorses lightheadedness and abdominal pain radiating to back with urination today. Denies nausea, vomiting, diarrhea, constipation, blood in bowel movements, burning with urination, hematuria, vaginal bleeding or discharge, fever, chest pain, or shortness of breath. LMP was in 2025. Patient had D&C for uterine fibroids last summer. Patient is , all vaginal deliveries. Remote history of appendectomy and abdominoplasty. No recent travel or sick contacts.

## 2025-06-07 NOTE — ED PROVIDER NOTE - ATTENDING CONTRIBUTION TO CARE
Attending Vipul: I performed a history and physical exam of the patient and discussed their management with the resident/fellow/student. I have reviewed the resident/fellow/student note and agree with the documented findings and plan of care, except as noted. I have personally performed a substantive portion of the visit including all aspects of the medical decision making. My medical decision making and observations are found below. Please refer to any progress notes for updates on clinical course. My notes supersedes the above resident/fellow/student note in case of discrepancy    MDM:  48 yo F w PMH of fibroids, borderline personality disorder presenting w/ abdominal pain. Seen w/ family. Reports for the past 2 weeks has been having lower abdominal and back cramping. Thought symptoms were Gyn related so saw her gynecologist 1 week ago. Reports had exam an urine testing, which she was told were normal. Yesterday abdominal pain worsened which prompted her ED visit today. Denies fevers, chills, headache, dizziness, blurred vision, chest pain, cough, shortness of breath, n/v/d/c, urinary symptoms, MSK pain, rash.     Gen: NAD, AOx3, able to make needs known, non-toxic  Head: NCAT  HEENT: EOMI, oral mucosa moist, normal conjunctiva  Lung: speaking in full sentences, no respiratory distress, CTAB  CV: RRR  Abd: non distended, soft, L sided tenderness, suprapubic tenderness, no guarding, no CVA tenderness  MSK: no visible deformities  Neuro: Appears non focal  Skin: Warm, well perfused  Psych: normal affect    Pt here w/ abd pain and lower back pain. Exam w/ L sided abdominal tenderness. Will obtain CT a/p to eval for acute surgical abdominal pathology. Eval for UTI/Pyelo. Lower suspicion for Gyn pathology such as torsion given age. Will consider TVUS, but will pursue CT a/p initially. Plan for labs, imaging, meds. Will reassess the need for additional interventions as clinically warranted. Refer to any progress notes for updates on clinical course and as a continuation of this MDM.

## 2025-06-09 LAB
CULTURE RESULTS: SIGNIFICANT CHANGE UP
SPECIMEN SOURCE: SIGNIFICANT CHANGE UP

## 2025-07-02 ENCOUNTER — APPOINTMENT (OUTPATIENT)
Dept: ORTHOPEDIC SURGERY | Facility: CLINIC | Age: 50
End: 2025-07-02
Payer: COMMERCIAL

## 2025-07-02 PROCEDURE — 99204 OFFICE O/P NEW MOD 45 MIN: CPT | Mod: 25

## 2025-07-02 PROCEDURE — 20526 THER INJECTION CARP TUNNEL: CPT | Mod: RT

## 2025-07-05 ENCOUNTER — APPOINTMENT (OUTPATIENT)
Dept: ORTHOPEDIC SURGERY | Facility: CLINIC | Age: 50
End: 2025-07-05

## 2025-07-05 PROCEDURE — 99204 OFFICE O/P NEW MOD 45 MIN: CPT

## 2025-07-05 RX ORDER — METHYLPREDNISOLONE 4 MG/1
4 TABLET ORAL
Qty: 1 | Refills: 0 | Status: ACTIVE | COMMUNITY
Start: 2025-07-05 | End: 1900-01-01